# Patient Record
Sex: FEMALE | Race: WHITE | NOT HISPANIC OR LATINO | Employment: OTHER | URBAN - NONMETROPOLITAN AREA
[De-identification: names, ages, dates, MRNs, and addresses within clinical notes are randomized per-mention and may not be internally consistent; named-entity substitution may affect disease eponyms.]

---

## 2017-06-14 ENCOUNTER — AMBULATORY - GICH (OUTPATIENT)
Dept: LAB | Facility: OTHER | Age: 62
End: 2017-06-14

## 2017-06-14 DIAGNOSIS — Z79.899 OTHER LONG TERM (CURRENT) DRUG THERAPY: ICD-10-CM

## 2017-06-14 LAB
CREAT SERPL-MCNC: 1.2 MG/DL (ref 0.7–1.3)
GFR IF NOT AFRICAN AMERICAN - HISTORICAL: 46 ML/MIN/1.73M2
POTASSIUM SERPL-SCNC: 4.5 MMOL/L (ref 3.5–5.1)

## 2017-06-16 ENCOUNTER — OFFICE VISIT - GICH (OUTPATIENT)
Dept: FAMILY MEDICINE | Facility: OTHER | Age: 62
End: 2017-06-16

## 2017-06-16 ENCOUNTER — HISTORY (OUTPATIENT)
Dept: FAMILY MEDICINE | Facility: OTHER | Age: 62
End: 2017-06-16

## 2017-06-16 DIAGNOSIS — J20.9 ACUTE BRONCHITIS: ICD-10-CM

## 2017-06-16 DIAGNOSIS — J01.20 ACUTE ETHMOIDAL SINUSITIS: ICD-10-CM

## 2017-07-27 ENCOUNTER — COMMUNICATION - GICH (OUTPATIENT)
Dept: FAMILY MEDICINE | Facility: OTHER | Age: 62
End: 2017-07-27

## 2017-07-27 DIAGNOSIS — E11.9 TYPE 2 DIABETES MELLITUS WITHOUT COMPLICATIONS (H): ICD-10-CM

## 2017-07-28 ENCOUNTER — COMMUNICATION - GICH (OUTPATIENT)
Dept: FAMILY MEDICINE | Facility: OTHER | Age: 62
End: 2017-07-28

## 2017-07-28 DIAGNOSIS — E11.9 TYPE 2 DIABETES MELLITUS WITHOUT COMPLICATIONS (H): ICD-10-CM

## 2017-07-29 ENCOUNTER — COMMUNICATION - GICH (OUTPATIENT)
Dept: FAMILY MEDICINE | Facility: OTHER | Age: 62
End: 2017-07-29

## 2017-07-29 DIAGNOSIS — E11.9 TYPE 2 DIABETES MELLITUS WITHOUT COMPLICATIONS (H): ICD-10-CM

## 2017-09-07 ENCOUNTER — COMMUNICATION - GICH (OUTPATIENT)
Dept: FAMILY MEDICINE | Facility: OTHER | Age: 62
End: 2017-09-07

## 2017-09-07 DIAGNOSIS — E11.9 TYPE 2 DIABETES MELLITUS WITHOUT COMPLICATIONS (H): ICD-10-CM

## 2017-11-16 ENCOUNTER — OFFICE VISIT - GICH (OUTPATIENT)
Dept: FAMILY MEDICINE | Facility: OTHER | Age: 62
End: 2017-11-16

## 2017-11-16 ENCOUNTER — HISTORY (OUTPATIENT)
Dept: FAMILY MEDICINE | Facility: OTHER | Age: 62
End: 2017-11-16

## 2017-11-16 DIAGNOSIS — I42.2 OTHER HYPERTROPHIC CARDIOMYOPATHY (H): ICD-10-CM

## 2017-11-16 DIAGNOSIS — Z00.00 ENCOUNTER FOR GENERAL ADULT MEDICAL EXAMINATION WITHOUT ABNORMAL FINDINGS: ICD-10-CM

## 2017-11-16 DIAGNOSIS — E11.9 TYPE 2 DIABETES MELLITUS WITHOUT COMPLICATIONS (H): ICD-10-CM

## 2017-11-16 DIAGNOSIS — I10 ESSENTIAL (PRIMARY) HYPERTENSION: ICD-10-CM

## 2017-11-16 LAB
ALB RAND URINE - HISTORICAL: 28.5 MG/L
ANION GAP - HISTORICAL: 9 (ref 5–18)
BUN SERPL-MCNC: 17 MG/DL (ref 7–25)
BUN/CREAT RATIO - HISTORICAL: 14
CALCIUM SERPL-MCNC: 9.4 MG/DL (ref 8.6–10.3)
CHLORIDE SERPLBLD-SCNC: 104 MMOL/L (ref 98–107)
CHOL/HDL RATIO - HISTORICAL: 3.09
CHOLESTEROL TOTAL: 139 MG/DL
CO2 SERPL-SCNC: 26 MMOL/L (ref 21–31)
CREAT SERPL-MCNC: 1.21 MG/DL (ref 0.7–1.3)
CREATININE, URINE - HISTORICAL: 2.14 G/L
ESTIMATED AVERAGE GLUCOSE: 169 MG/DL
GFR IF NOT AFRICAN AMERICAN - HISTORICAL: 45 ML/MIN/1.73M2
GLUCOSE SERPL-MCNC: 173 MG/DL (ref 70–105)
HDLC SERPL-MCNC: 45 MG/DL (ref 23–92)
HEMOGLOBIN A1C MONITORING (POCT) - HISTORICAL: 7.5 % (ref 4–6.2)
LDLC SERPL CALC-MCNC: 59 MG/DL
MICROALBUMIN, RAND UR - HISTORICAL: 13.3 MG/G CREAT
NON-HDL CHOLESTEROL - HISTORICAL: 94 MG/DL
POTASSIUM SERPL-SCNC: 4.8 MMOL/L (ref 3.5–5.1)
PROVIDER ORDERDED STATUS - HISTORICAL: ABNORMAL
SODIUM SERPL-SCNC: 139 MMOL/L (ref 133–143)
TRIGL SERPL-MCNC: 177 MG/DL

## 2017-12-27 NOTE — PROGRESS NOTES
"Patient Information     Patient Name MRN Glenys Parks [id number] Female 1955      Progress Notes by Ton Chaudhry MD at 2017 10:18 AM     Author:  Ton Chaudhry MD Service:  (none) Author Type:  Physician     Filed:  2017 12:28 PM Encounter Date:  2017 Status:  Signed     :  Ton Chaudhry MD (Physician)              SUBJECTIVE:    Glenys Ladd is a 62 y.o. female who presents for px    HPI: She really has no concerns.  Needs refills of all her medications.  Sees Cardiology every 6 months now.  Has no shortness of breath, exercise intolerance or chest pain.  Is rather sedentary by her own admission.  Intends to start back on her treadmill soon.    Is due for an A1c.  Checks a few times a week.  Is always under 130 or so.  Lowest was 90.  Seems to feel \"low\" when she is cleaning the house and more active.    Past Medical History:     Diagnosis  Date     AORTIC STENOSIS     last ultrasound was 2005       AORTIC STENOSIS     last ultrasound was 2014       Ascending aorta enlargement (HC) 2014    - echo 2014: Ascending aorta 4.6 cm       Cardiomyopathy (HC) 2014     CHF, acute on chronic (HC) 2014    With systolic dysfunction EF 20-25% by transthroacic echocardiogram 2014.       DIABETES MELLITUS, TYPE II 10/27/2010     DIVERTICULOSIS, SIGMOID COLON      CHANG (dyspnea on exertion) 2014     Elevated cholesterol 2013     Hx of VSD (ventricular septal defect) 2014    - apparent hx of VSD that had spontaneous closure at age 17 when pregnant (no documentation)       Mitral regurgitation 2014    - echo 2014: moderate MR       POSTMENOPAUSAL STATUS 10/27/2010     S/P ascending aortic aneurysm repair 2014    Repair Ascending Aortic Aneurysm with a 30 mm Gelweave Graft        S/P AVR (aortic valve replacement) 2014    Aortic Valve Replacement with a 25 mm Magna Valve         S/P mitral valve repair 2014 "    Mitral Valve Ring Annuloplasty with a 32 mm Rigid Saddle Ring      Tachycardia 7/17/2014     VAGINITIS 10/27/2010       PROBLEM LIST:  Patient Active Problem List     Diagnosis  Code     AORTIC STENOSIS I35.9     DIVERTICULOSIS, SIGMOID COLON K57.30     DIABETES MELLITUS, TYPE II E11.9     VAGINITIS N76.0     POSTMENOPAUSAL STATUS N95.1     Elevated cholesterol E78.00     Tachycardia R00.0     Ascending aorta enlargement (HC) I77.89     Mitral regurgitation I34.0     Cardiomyopathy (HC) I42.9     Hx of VSD (ventricular septal defect) Q21.0     CHANG (dyspnea on exertion) R06.09     S/P AVR (aortic valve replacement) Z95.2     S/P ascending aortic aneurysm repair Z98.890, Z86.79     S/P mitral valve repair Z98.890     CHF, acute on chronic (HC) I50.9     Hypertension I10     Morbid obesity with BMI of 40.0-44.9, adult (HC) E66.01, Z68.41     Special screening for malignant neoplasms, colon Z12.11     Past Surgical History:      Procedure  Laterality Date     COLONOSCOPY SCREENING  09/07    Follow up recommended in five years.       COLONOSCOPY SCREENING  9/1/2016            IN REPLACE AORTIC VALVE OPEN W STENTLESS TISSUE VALVE  7/14    Pacemaker placed as well       TONSIL AND ADENOIDECTOMY       as a child       TUBAL LIGATION       PAST MEDICAL HISTORY:  Past Medical History:     Diagnosis  Date     AORTIC STENOSIS     last ultrasound was april 2005       AORTIC STENOSIS     last ultrasound was april 2014       Ascending aorta enlargement (HC) 7/17/2014    - echo April 2014: Ascending aorta 4.6 cm       Cardiomyopathy (HC) 7/18/2014     CHF, acute on chronic (HC) 7/25/2014    With systolic dysfunction EF 20-25% by transthroacic echocardiogram 7/2014.       DIABETES MELLITUS, TYPE II 10/27/2010     DIVERTICULOSIS, SIGMOID COLON      CHANG (dyspnea on exertion) 7/18/2014     Elevated cholesterol 9/19/2013     Hx of VSD (ventricular septal defect) 7/18/2014    - apparent hx of VSD that had spontaneous closure at age 17  when pregnant (no documentation)       Mitral regurgitation 7/18/2014    - echo April 2014: moderate MR       POSTMENOPAUSAL STATUS 10/27/2010     S/P ascending aortic aneurysm repair 7/21/2014    Repair Ascending Aortic Aneurysm with a 30 mm Gelweave Graft        S/P AVR (aortic valve replacement) 7/21/2014    Aortic Valve Replacement with a 25 mm Magna Valve         S/P mitral valve repair 7/21/2014    Mitral Valve Ring Annuloplasty with a 32 mm Rigid Saddle Ring      Tachycardia 7/17/2014     VAGINITIS 10/27/2010     SURGICAL HISTORY:  Past Surgical History:      Procedure  Laterality Date     COLONOSCOPY SCREENING  09/07    Follow up recommended in five years.       COLONOSCOPY SCREENING  9/1/2016            [place] REPLACE AORTIC VALVE OPEN W STENTLESS TISSUE VALVE  7/14    Pacemaker placed as well       TONSIL AND ADENOIDECTOMY       as a child       TUBAL LIGATION         SOCIAL HISTORY:  Social History     Social History        Marital status:       Spouse name: N/A     Number of children:  N/A     Years of education:  N/A     Occupational History      Not on file.     Social History Main Topics        Smoking status:  Former Smoker     Packs/day: 0.50     Years: 20.00     Smokeless tobacco:  Never Used     Alcohol use  No     Drug use:  No     Sexual activity:  Not on file     Other Topics   Concern      Service  No     Blood Transfusions  Yes     Caffeine Concern  No     Occasional coffee      Occupational Exposure  No     Hobby Hazards  No     Sleep Concern  No     Stress Concern  No     Weight Concern  Yes     losing weight      Special Diet  No     Back Care  No     Exercise  Yes     Walk 3-4 times a week, swim and play ball in summer      Seat Belt  Yes     100%      Self-Exams  Yes     Breast      Social History Narrative      and works as a  at "[employer]        **pre upload 12/31/2012**        Updated  9/18/2013                         FAMILY HISTORY:  Family History        Problem   Relation Age of Onset     Cancer  Mother       age 74 of liver cancer with a history of CAD       Heart Disease  Mother      history of CAD       Unknown  Father      Unknown       Blood Disease  Sister      Leukemia       Diabetes  Other      Negative for diabetes mellitus       Cancer-breast  Other       CURRENT MEDICATIONS:   Current Outpatient Prescriptions       Medication  Sig Dispense Refill     aspirin chewable 81 mg chewable tablet Take 1 tablet by mouth once daily with a meal.  0     bisoprolol (ZEBETA) 5 mg tablet Take 1 tablet by mouth 2 times daily. 180 tablet 3     Blood Pressure Monitor (BLOOD PRESSURE KIT)  1 Device 0     blood sugar diagnostic (ACCU-CHEK SMARTVIEW TEST STRIP) strip 2 times daily. Dispense item covered by pt ins. Type 2 diabetes mellitus without insulin 100 Strip 11     lancets (ACCU-CHEK FASTCLIX) Test 2 times per day. 100 Each 8     lisinopril (PRINIVIL; ZESTRIL) 10 mg tablet Take 1 tablet by mouth once daily. 90 tablet 3     metFORMIN (GLUCOPHAGE) 1,000 mg tablet Take 1 tablet by mouth 2 times daily with meals. 180 tablet 0     simvastatin (ZOCOR) 10 mg tablet TAKE 1 TABLET BY MOUTH AT BEDTIME 90 tablet 0     spironolactone (ALDACTONE) 25 mg tablet Take 1 tablet by mouth once daily. 92 tablet 3     No current facility-administered medications for this visit.      Medications have been reviewed by me and are current to the best of my knowledge and ability.    ALLERGIES:  Azithromycin     REVIEW OF SYSTEMS:  General: denies any general problems.  Eyes: denies problems  Ears/Nose/Throat: denies problems  Cardiovascular: denies problems  Respiratory: denies problems  Gastrointestinal: denies problems  Genitourinary: denies problems  Musculoskeletal: denies problems  Skin: denies problems  Neurologic: denies problems  Psychiatric: denies problems  Endocrine: denies problems  Heme/Lymphatic: denies problems  Allergic/Immunologic: denies problems  PHQ Depression Screening  "11/16/2017   Date of PHQ exam (doc flow) 11/16/2017   1. Lack of interest/pleasure 0 - Not at all   2. Feeling down/depressed 0 - Not at all   PHQ-2 TOTAL SCORE 0   Some recent data might be hidden       OBJECTIVE:  /70  Pulse 68  Resp 16  Ht 1.689 m (5' 6.5\")  Wt 124.7 kg (275 lb)  BMI 43.72 kg/m2  EXAM:   General Appearance: Pleasant, alert, appropriate appearance for age. No acute distress  Head Exam: Normal. Normocephalic, atraumatic.  Eye Exam:  Normal external eye, conjunctiva, lids, cornea. RTUDI.  Ear Exam: Normal TM's bilaterally. Normal auditory canals and external ears. Non-tender.  Nose Exam: Normal external nose, mucus membranes, and septum.  OroPharynx Exam:  Dental hygiene adequate. Normal buccal mucose. Normal pharynx.  Neck Exam:  Supple, no masses or nodes.  Thyroid Exam: No nodules or enlargement.  Chest/Respiratory Exam: Normal chest wall and respirations. Clear to auscultation.  Cardiovascular Exam: Regular rate and rhythm. S1, S2, stable 2/6 systolic ejection murmur, no click, gallop, or rubs.  Gastrointestinal Exam: Soft, non-tender, no masses or organomegaly.  Lymphatic Exam: Non-palpable nodes in neck, clavicular, axillary, or inguinal regions.  Musculoskeletal Exam: Back is straight and non-tender, full ROM of upper and lower extremities.  Foot Exam: Left and right foot: good pedal pulses, no lesions, nail hygiene good.  Skin: no rash or abnormalities  Neurologic Exam: Nonfocal, symmetric DTRs, normal gross motor, tone coordination and no tremor.  Monofilament is normal  Psychiatric Exam: Alert and oriented - appropriate affect.    ASSESSMENT/PLAN    ICD-10-CM    1. Routine general medical examination at a health care facility Z00.00 MO [facility] MAMMOGRAM SCREENING   2. Type 2 diabetes mellitus without complication, without long-term current use of insulin (HC) E11.9 metFORMIN (GLUCOPHAGE) 1,000 mg tablet      simvastatin (ZOCOR) 10 mg tablet      Hgb A1c      MICROALBUMIN RANDOM URINE "   3. Hypertrophic nonobstructive cardiomyopathy (HC) I42.2 lisinopril (PRINIVIL; ZESTRIL) 10 mg tablet      bisoprolol (ZEBETA) 5 mg tablet      LIPID PANEL   4. Hypertension I10 spironolactone (ALDACTONE) 25 mg tablet      BASIC METABOLIC PANEL     BP Readings from Last 1 Encounters:11/16/17 : 126/70  Ms. Lais blood pressure is out of the normal range for adults. Per JNC-8 guidelines normal adult blood pressure is < 120/80, pre-hypertensive is between 120/80 and 139/89, and hypertension is 140/90 or greater. Risks of hypertension were discussed. Patient's strategy will be to recheck blood pressure in 12 months    I would like her to follow up on the diabetes mellitus at least every 6 months.  Refilled all of her above medications.

## 2017-12-28 NOTE — PROGRESS NOTES
Patient Information     Patient Name MRN Sex Glenys Stapleton [id number] Female 1955      Progress Notes by Carmella Jacobs NP at 2017  3:15 PM     Author:  Carmella Jacobs NP Service:  (none) Author Type:  PHYS- Nurse Practitioner     Filed:  2017  3:58 PM Encounter Date:  2017 Status:  Signed     :  Carmella Jacobs NP (PHYS- Nurse Practitioner)            HPI:    Glenys Ladd is.  a 61 y.o. female who presents to clinic today for URI.    Cough for about 4 weeks.  Coughing up minimal phlegm, cloudy.  Wheezing for the past week.  Shortness of breath with exertion.  Difficulty sleeping due to cough.   Sinus headache x 4 days.  Headache worsens with coughing.   Runny and stuffy nose, congestion and pressure for the past 2 weeks.   No fevers.  Sweats with coughing.  Appetite normal.  Energy decreased.   Tried a bottle of theraflu without relief.  Occasional tylenol for headaches.   Has pacemaker, just had cardiology check up.            Past Medical History:     Diagnosis  Date     AORTIC STENOSIS     last ultrasound was 2005       AORTIC STENOSIS     last ultrasound was 2014       Ascending aorta enlargement (HC) 2014    - echo 2014: Ascending aorta 4.6 cm       Cardiomyopathy (HC) 2014     CHF, acute on chronic (HC) 2014    With systolic dysfunction EF 20-25% by transthroacic echocardiogram 2014.       DIABETES MELLITUS, TYPE II 10/27/2010     DIVERTICULOSIS, SIGMOID COLON      CHANG (dyspnea on exertion) 2014     Elevated cholesterol 2013     Hx of VSD (ventricular septal defect) 2014    - apparent hx of VSD that had spontaneous closure at age 17 when pregnant (no documentation)       Mitral regurgitation 2014    - echo 2014: moderate MR       POSTMENOPAUSAL STATUS 10/27/2010     S/P ascending aortic aneurysm repair 2014    Repair Ascending Aortic Aneurysm with a 30 mm Gelweave Graft        S/P AVR (aortic  valve replacement) 7/21/2014    Aortic Valve Replacement with a 25 mm Magna Valve         S/P mitral valve repair 7/21/2014    Mitral Valve Ring Annuloplasty with a 32 mm Rigid Saddle Ring      Tachycardia 7/17/2014     VAGINITIS 10/27/2010     Past Surgical History:      Procedure  Laterality Date     COLONOSCOPY SCREENING  09/07    Follow up recommended in five years.       COLONOSCOPY SCREENING  9/1/2016            MD REPLACE AORTIC VALVE OPEN W STENTLESS TISSUE VALVE  7/14    Pacemaker placed as well       TONSIL AND ADENOIDECTOMY       as a child       TUBAL LIGATION       Social History      Substance Use Topics        Smoking status:  Former Smoker     Packs/day: 0.50     Years: 20.00     Smokeless tobacco:  Never Used     Alcohol use  No     Current Outpatient Prescriptions       Medication  Sig Dispense Refill     aspirin chewable 81 mg chewable tablet Take 1 tablet by mouth once daily with a meal.  0     bisoprolol (ZEBETA) 5 mg tablet Take 1 tablet by mouth 2 times daily. 180 tablet 3     Blood Pressure Monitor (BLOOD PRESSURE KIT)  1 Device 0     blood sugar diagnostic (ACCU-CHEK SMARTVIEW TEST STRIP) strip 2 times daily. Dispense item covered by pt ins. Type 2 diabetes mellitus without insulin 100 Strip 11     lancets (ACCU-CHEK FASTCLIX) Test 2 times per day. 100 Each 8     lisinopril (PRINIVIL; ZESTRIL) 10 mg tablet Take 1 tablet by mouth once daily. 90 tablet 3     metFORMIN (GLUCOPHAGE) 1,000 mg tablet Take 1 tablet by mouth 2 times daily with meals. 180 tablet 3     simvastatin (ZOCOR) 10 mg tablet Take 1 tablet by mouth at bedtime. 90 tablet 3     spironolactone (ALDACTONE) 25 mg tablet Take 1 tablet by mouth once daily. 92 tablet 3     No current facility-administered medications for this visit.      Medications have been reviewed by me and are current to the best of my knowledge and ability.    Allergies     Allergen  Reactions     Azithromycin Rash       Past medical history, past surgical  "history, current medications and allergies reviewed and accurate to the best of my knowledge.        ROS:  Refer to HPI    /80  Pulse 80  Temp 98.5  F (36.9  C) (Tympanic)   Ht 1.68 m (5' 6.14\")  Wt 126.4 kg (278 lb 9.6 oz)  Breastfeeding? No  BMI 44.77 kg/m2    EXAM:  General Appearance: Well appearing adult female, appropriate appearance for age. No acute distress  Head: normocephalic, atraumatic  Ears: Left TM with bony landmarks appreciated, no erythema, no effusion, no bulging, no purulence.  Right TM with bony landmarks appreciated, no erythema, no effusion, no bulging, no purulence.   Left auditory canal clear.  Right auditory canal clear.  Normal external ears, non tender.  Eyes: conjunctivae normal, no drainage  Orophayrnx: moist mucous membranes, posterior pharynx without erythema, tonsils surgically absent,  no post nasal drip seen.    Sinuses:  No sinus tenderness upon palpation of the frontal or maxillary sinuses.  Tenderness and pressure of the ethmoid sinuses  Nose:  Bilateral nares without erythema, edema, drainage or congestion   Neck: supple without adenopathy  Respiratory: normal chest wall and respirations.  Normal effort.  Clear to auscultation bilaterally, no wheezing, crackles or rhonchi.  No increased work of breathing.  Frequent congested bronchial cough appreciated  Cardiac: RRR with no murmurs  Musculoskeletal:  Normal gait.  Equal movement of bilateral upper extremities.  Equal movement of bilateral lower extremities.    Psychological: normal affect, alert and pleasant        ASSESSMENT/PLAN:    ICD-10-CM    1. Bronchitis, acute, with bronchospasm J20.9 amoxicillin-clavulanate 875-125 mg tablet (AUGMENTIN)      predniSONE (DELTASONE) 20 mg tablet   2. Acute ethmoidal sinusitis, recurrence not specified J01.20 amoxicillin-clavulanate 875-125 mg tablet (AUGMENTIN)         Augmentin 875-125 mg BID x 10 days  Prednisone 20 mg daily x 3 days, take with food  Encouraged " fluids  Symptomatic treatment - salt water gargles, honey, elevation, humidifier, sinus rinse/netti pot, lozenges, etc   Tylenol or ibuprofen PRN  Follow up if symptoms persist or worsen or concerns          Patient Instructions   Prednisone once daily x 3 days, take with food    Augmentin twice daily x 10 days         Encouraged fluids and rest.    May use symptomatic care with tylenol or ibuprofen.     Using a humidifier works well to break up the congestion.     Elevate the mattress to 15 degrees in order to help with the congestion.    Frequent swallows of cool liquid.      Oatmeal or honey coats the throat and some patients find it soothes the pain.     Salt water gargles as needed    Return to clinic with change/worsening of symptoms or concerns.

## 2017-12-28 NOTE — TELEPHONE ENCOUNTER
Patient Information     Patient Name MRN Glenys Parks [id number] Female 1955      Telephone Encounter by Mateo Alvarado RN at 2017 11:16 AM     Author:  Mateo Alvarado RN Service:  (none) Author Type:  NURS- Registered Nurse     Filed:  2017 11:21 AM Encounter Date:  2017 Status:  Signed     :  Mateo Alvarado RN (NURS- Registered Nurse)            Statins    Office visit in the past 12 months.    Last visit with SAHARA ROCHA was on: 2016 in [employer] [facility]  Next visit with SAHARA ROCHA is on: No future appointment listed with this provider  Next visit with Family Practice is on: No future appointment listed in this department    Lab testing requirements:  Lipids annually.  Repeat lipids 6-8 weeks after dosage or drug change.    Last Lipids:  Chol: 136    7/15/2016  T    7/15/2016  HDL:   43    7/15/2016  LDL:  70    7/15/2016  LDL DIRECT:  No results found in past 5 years    .    Concommitant use of fibrates and statins-If it is an addition to the medication list, review note and/or discuss with provider.  If already on medication list, refill.    Max refills 12 months from last office visit.    Pt received 90 day supply on 17  That was confirmed by pharmacy as received . Requested too soon. Unable to complete prescription refill per RN Medication Refill Policy.................... MATEO ALVARADO RN ....................  2017   11:19 AM

## 2017-12-28 NOTE — TELEPHONE ENCOUNTER
Patient Information     Patient Name MRN Glenys Parks [id number] Female 1955      Telephone Encounter by Sky Savage RN at 2017  6:43 AM     Author:  Sky Savage RN Service:  (none) Author Type:  NURS- Registered Nurse     Filed:  2017  6:44 AM Encounter Date:  2017 Status:  Signed     :  Sky Savage RN (NURS- Registered Nurse)            Statins    Office visit in the past 12 months.    Last visit with SAHARA ROCHA was on: 2016 in [employer] [facility]  Next visit with SAHARA ROCHA is on: No future appointment listed with this provider  Next visit with Family Practice is on: No future appointment listed in this department    Lab testing requirements:  Lipids annually.  Repeat lipids 6-8 weeks after dosage or drug change.    Last Lipids:  Chol: 136    7/15/2016  T    7/15/2016  HDL:   43    7/15/2016  LDL:  70    7/15/2016  LDL DIRECT:  No results found in past 5 years    .    Concommitant use of fibrates and statins-If it is an addition to the medication list, review note and/or discuss with provider.  If already on medication list, refill.    Max refills 12 months from last office visit.    Prescription refilled per RN Medication Refill Policy.................... SKY SAVAGE RN ....................  2017   6:44 AM

## 2017-12-28 NOTE — TELEPHONE ENCOUNTER
Patient Information     Patient Name MRN Glenys Parks [id number] Female 1955      Telephone Encounter by Alina Abdullahi RN at 2017  3:00 PM     Author:  Alina Abdullahi RN Service:  (none) Author Type:  NURS- Registered Nurse     Filed:  2017  3:07 PM Encounter Date:  2017 Status:  Signed     :  Alina Abdullahi RN (NURS- Registered Nurse)            Biguanides    Office visit in the past 12 months or per provider note.    Last visit with SAHARA ROCHA was on: 2016 in [facility]  Next visit with SAHARA ROCHA is on: No future appointment listed with this provider  Next visit with Family Practice is on: No future appointment listed in this department    Lab test requirements:  HgbA1c annually or per provider note.  HEMOGLOBIN A1C MONITORING (POCT) (%)    Date Value   07/15/2016 6.5 (H)   2014 5.7       Max refill for 12 months from last office visit or per provider note.    If taking for polycystic ovary disease, may refill for 12 months.    Patient is due for medication management appointment. Limited refill provided at this time. [employer] message and/or letter sent for reminder to patient. Prescription refilled per RN Medication Refill Policy.................... Alina Abdullahi RN ....................  2017   3:01 PM

## 2017-12-28 NOTE — PROGRESS NOTES
Patient Information     Patient Name MRN Glenys Parks [id number] Female 1955      Progress Notes by Yandy Huerta at 2017  2:43 PM     Author:  Yandy Huerta Service:  (none) Author Type:  (none)     Filed:  2017  2:43 PM Encounter Date:  2017 Status:  Signed     :  Yandy Huerta            See telephone encounter.  Yandy Huerta

## 2017-12-29 NOTE — PATIENT INSTRUCTIONS
Patient Information     Patient Name MRN Glenys Parks [id number] Female 1955      Patient Instructions by Carmella Jacobs NP at 2017  3:15 PM     Author:  Carmella Jacobs NP Service:  (none) Author Type:  PHYS- Nurse Practitioner     Filed:  2017  3:45 PM Encounter Date:  2017 Status:  Signed     :  Carmella Jacobs NP (PHYS- Nurse Practitioner)            Prednisone once daily x 3 days, take with food    Augmentin twice daily x 10 days         Encouraged fluids and rest.    May use symptomatic care with tylenol or ibuprofen.     Using a humidifier works well to break up the congestion.     Elevate the mattress to 15 degrees in order to help with the congestion.    Frequent swallows of cool liquid.      Oatmeal or honey coats the throat and some patients find it soothes the pain.     Salt water gargles as needed    Return to clinic with change/worsening of symptoms or concerns.

## 2017-12-30 NOTE — NURSING NOTE
Patient Information     Patient Name MRN Glenys Parks [id number] Female 1955      Nursing Note by Kathryn Van at 2017  3:15 PM     Author:  Kathryn Van Service:  (none) Author Type:  NURS- Student Practical Nurse     Filed:  2017  3:35 PM Encounter Date:  2017 Status:  Signed     :  Kathryn Van (NURS- Student Practical Nurse)            Patient presents with cough, sinus congestion, wheezing, lack of sleep for 2 weeks. Headache for 4 days. Kathryn Van LPN .............2017  3:29 PM

## 2017-12-30 NOTE — NURSING NOTE
Patient Information     Patient Name MRN Glenys Parks [id number] Female 1955      Nursing Note by Chiquita Reddy at 2017 10:00 AM     Author:  Chiquita Reddy Service:  (none) Author Type:  (none)     Filed:  2017 10:17 AM Encounter Date:  2017 Status:  Signed     :  Chiquita Reddy            Coming in for a physical   Chiquita Reddy ....................  2017   9:57 AM

## 2018-01-04 ENCOUNTER — HOSPITAL ENCOUNTER (OUTPATIENT)
Dept: RADIOLOGY | Facility: OTHER | Age: 63
End: 2018-01-04
Attending: FAMILY MEDICINE

## 2018-01-04 ENCOUNTER — HISTORY (OUTPATIENT)
Dept: RADIOLOGY | Facility: OTHER | Age: 63
End: 2018-01-04

## 2018-01-04 DIAGNOSIS — Z12.31 ENCOUNTER FOR SCREENING MAMMOGRAM FOR MALIGNANT NEOPLASM OF BREAST: ICD-10-CM

## 2018-01-26 VITALS
HEIGHT: 66 IN | WEIGHT: 278.6 LBS | DIASTOLIC BLOOD PRESSURE: 80 MMHG | HEART RATE: 80 BPM | BODY MASS INDEX: 44.77 KG/M2 | SYSTOLIC BLOOD PRESSURE: 140 MMHG | TEMPERATURE: 98.5 F

## 2018-01-26 VITALS
DIASTOLIC BLOOD PRESSURE: 70 MMHG | RESPIRATION RATE: 16 BRPM | SYSTOLIC BLOOD PRESSURE: 126 MMHG | WEIGHT: 275 LBS | BODY MASS INDEX: 43.16 KG/M2 | HEIGHT: 67 IN | HEART RATE: 68 BPM

## 2018-02-07 ENCOUNTER — DOCUMENTATION ONLY (OUTPATIENT)
Dept: FAMILY MEDICINE | Facility: OTHER | Age: 63
End: 2018-02-07

## 2018-02-07 PROBLEM — K57.30 DIVERTICULOSIS OF LARGE INTESTINE: Status: ACTIVE | Noted: 2018-02-07

## 2018-02-07 RX ORDER — BLOOD PRESSURE TEST KIT-MEDIUM
KIT MISCELLANEOUS
COMMUNITY
Start: 2014-09-05

## 2018-02-07 RX ORDER — ASPIRIN 81 MG/1
81 TABLET, CHEWABLE ORAL
COMMUNITY

## 2018-02-07 RX ORDER — SIMVASTATIN 10 MG
10 TABLET ORAL AT BEDTIME
COMMUNITY
Start: 2017-11-16 | End: 2020-08-14

## 2018-02-07 RX ORDER — BISOPROLOL FUMARATE 5 MG/1
5 TABLET, FILM COATED ORAL 2 TIMES DAILY
COMMUNITY
Start: 2017-11-16 | End: 2020-08-14

## 2018-02-07 RX ORDER — LISINOPRIL 10 MG/1
10 TABLET ORAL DAILY
COMMUNITY
Start: 2017-11-16 | End: 2020-08-14

## 2018-02-07 RX ORDER — SPIRONOLACTONE 25 MG/1
25 TABLET ORAL DAILY
COMMUNITY
Start: 2017-11-16 | End: 2018-11-18

## 2018-02-07 RX ORDER — BLOOD-GLUCOSE METER
KIT MISCELLANEOUS 2 TIMES DAILY
COMMUNITY
Start: 2013-04-26 | End: 2020-08-14

## 2018-02-12 NOTE — PROGRESS NOTES
Patient Information     Patient Name MRN Glenys Parks [id number] Female 1955      Progress Notes by Landy Vargas at 2018  8:19 AM     Author:  Landy Vargas Service:  (none) Author Type:  (none)     Filed:  2018  8:19 AM Date of Service:  2018  8:19 AM Status:  Signed     :  Landy Vargas            Falls Risk Criteria:    Age 65 and older or under age 4        Sensory deficits    Poor vision    Use of ambulatory aides    Impaired judgment    Unable to walk independently    Meets High Risk criteria for falls:  no

## 2018-07-24 NOTE — PROGRESS NOTES
Patient Information     Patient Name  Glenys Ladd MRN  [id number] Sex  Female   1955      Letter by Ton Chaudhry MD at      Author:  Ton Chaudhry MD Service:  (none) Author Type:  (none)    Filed:   Encounter Date:  2017 Status:  (Other)           Glenys Ladd  [address] [facility] [address]          2017    Dear Ms. Ladd:    This letter is to remind you that you are due for your annual exam with Ton Chaudhry MD. Your last comprehensive visit was more than 12 months ago.    A LIMITED refill of metFORMIN (GLUCOPHAGE) 1,000 mg tablet has been called into your pharmacy. Additional refills require you to complete this appointment.    Please call the clinic at [phone] to schedule your appointment.    If you should require additional refills before your scheduled appointment, please contact your pharmacy and we will refill your medication until the date of your appointment.    If you are no longer seeing Ton Chaudhry MD for primary care, please call to let us know. Doing so will remove you from our call/contact list.      Thank you for choosing [facility] for your health care needs.    Sincerely,    Refill RN  [facility]

## 2018-11-11 ENCOUNTER — HOSPITAL ENCOUNTER (EMERGENCY)
Facility: OTHER | Age: 63
Discharge: HOME | End: 2018-11-11
Attending: FAMILY MEDICINE | Admitting: FAMILY MEDICINE
Payer: COMMERCIAL

## 2018-11-11 VITALS
HEIGHT: 66 IN | BODY MASS INDEX: 43.39 KG/M2 | OXYGEN SATURATION: 97 % | TEMPERATURE: 97.4 F | WEIGHT: 270 LBS | RESPIRATION RATE: 15 BRPM

## 2018-11-11 DIAGNOSIS — H10.13 ALLERGIC CONJUNCTIVITIS, BILATERAL: ICD-10-CM

## 2018-11-11 PROCEDURE — 99283 EMERGENCY DEPT VISIT LOW MDM: CPT | Mod: Z6 | Performed by: FAMILY MEDICINE

## 2018-11-11 PROCEDURE — 99283 EMERGENCY DEPT VISIT LOW MDM: CPT | Performed by: FAMILY MEDICINE

## 2018-11-11 PROCEDURE — 25000132 ZZH RX MED GY IP 250 OP 250 PS 637: Performed by: FAMILY MEDICINE

## 2018-11-11 RX ORDER — POLYMYXIN B SULFATE AND TRIMETHOPRIM 1; 10000 MG/ML; [USP'U]/ML
2 SOLUTION OPHTHALMIC ONCE
Status: COMPLETED | OUTPATIENT
Start: 2018-11-11 | End: 2018-11-11

## 2018-11-11 RX ORDER — POLYMYXIN B SULFATE AND TRIMETHOPRIM 1; 10000 MG/ML; [USP'U]/ML
2 SOLUTION OPHTHALMIC
Status: DISCONTINUED | OUTPATIENT
Start: 2018-11-11 | End: 2018-11-11

## 2018-11-11 RX ADMIN — POLYMYXIN B SULFATE AND TRIMETHOPRIM 2 DROP: 1; 10000 SOLUTION OPHTHALMIC at 02:28

## 2018-11-11 ASSESSMENT — ENCOUNTER SYMPTOMS
FEVER: 0
EYE PAIN: 1
HEADACHES: 0
FATIGUE: 0
VOMITING: 0
COUGH: 0
EYE DISCHARGE: 1
EYE REDNESS: 1
EYE ITCHING: 1
SORE THROAT: 0
NAUSEA: 0
PHOTOPHOBIA: 0
CHILLS: 0

## 2018-11-11 NOTE — ED TRIAGE NOTES
"ED Nursing Triage Note (General)   ________________________________    Glenys Ladd is a 63 year old Female that presents to triage private car  With history of  Pink eye that had onset Friday night with itching and yesterday had worsening of sx by evening and now having reddened sclera, itching, mattering and pain.  Blurring of vision due to mattered eyes.  Vision at 20/50 for both eyes  reported by patient   Significant symptoms had onset 2 day(s) ago.  Temp 97.4  F (36.3  C) (Tympanic)  Resp 15  Ht 1.676 m (5' 6\")  Wt 122.5 kg (270 lb)  LMP  (Approximate)  SpO2 97%  BMI 43.58 kg/m2t  Patient appears alert , in moderate distress., and cooperative behavior.    GCS Total = 15  Airway: intact  Breathing noted as Normal.  Circulation Normal  Skin normal  Action taken:  To room 907      PRE HOSPITAL PRIOR LIVING SITUATION Spouse and Children  "

## 2018-11-11 NOTE — ED AVS SNAPSHOT
[facility]    [address]    [place] [address]    Phone:  [phone]    Fax:  [phone]                                       Glenys Ladd   MRN: [id number]    Department:  [facility]   Date of Visit:  11/11/2018           Patient Information     Date Of Birth          1955        Your diagnoses for this visit were:     Allergic conjunctivitis, bilateral        You were seen by Curt Phillips MD.      Follow-up Information     Follow up with Ton Chaudhry MD.    Specialty:  Family Practice    Why:  If symptoms worsen    Contact information:    [email or web address]  [place] [address]  [phone]        Discharge References/Attachments     CONJUNCTIVITIS, NONSPECIFIC (ENGLISH)      24 Hour Appointment Hotline     To schedule an appointment at [facility], please call [phone]. If you don't have a family doctor or clinic, we will help you find one. [facility] are conveniently located to serve the needs of you and your family.           Review of your medicines      Our records show that you are taking the medicines listed below. If these are incorrect, please call your family doctor or clinic.        Dose / Directions Last dose taken    aspirin 81 MG chewable tablet   Dose:  81 mg        Take 81 mg by mouth daily with food   Refills:  0        B-D ULTRA-FINE 33 LANCETS Misc        2 times daily   Refills:  0        bisoprolol 5 MG tablet   Commonly known as:  ZEBETA   Dose:  5 mg        Take 5 mg by mouth 2 times daily   Refills:  0        blood glucose monitoring test strip   Commonly known as:  no brand specified        2 times daily. Dispense item covered by pt ins. Type 2 diabetes mellitus without insulin   Refills:  0        lisinopril 10 MG tablet   Commonly known as:  PRINIVIL/ZESTRIL   Dose:  10 mg        Take 10 mg by mouth daily   Refills:  0        metFORMIN 1000 MG tablet   Commonly known as:  GLUCOPHAGE   Dose:   "1000 mg        Take 1,000 mg by mouth 2 times daily (with meals)   Refills:  0        RA BLOOD PRESSURE CUFF MONITOR Misc        Refills:  0        simvastatin 10 MG tablet   Commonly known as:  ZOCOR   Dose:  10 mg        Take 10 mg by mouth At Bedtime   Refills:  0        spironolactone 25 MG tablet   Commonly known as:  ALDACTONE   Dose:  25 mg        Take 25 mg by mouth daily   Refills:  0                Orders Needing Specimen Collection     None      Pending Results     No orders found from 11/9/2018 to 11/12/2018.            Pending Culture Results     No orders found from 11/9/2018 to 11/12/2018.            Pending Results Instructions     If you had any lab results that were not finalized at the time of your Discharge, you can call the ED Lab Result RN at [phone]. You will be contacted by this team for any positive Lab results or changes in treatment. The nurses are available 7 days a week from 10A to 6:30P.  You can leave a message 24 hours per day and they will return your call.        Thank you for choosing [facility]       Thank you for choosing [facility] for your care. Our goal is always to provide you with excellent care. Hearing back from our patients is one way we can continue to improve our services. Please take a few minutes to complete the written survey that you may receive in the mail after you visit with us. Thank you!        [employer] Information     [employer] lets you send messages to your doctor, view your test results, renew your prescriptions, schedule appointments and more. To sign up, go to [email or web address] . Click on \"Log in\" on the left side of the screen, which will take you to the Welcome page. Then click on \"Sign up Now\" on the right side of the page.     You will be asked to enter the access code listed below, as well as some personal information. Please follow the directions to create your username and password.     Your access code is: [email or web address]  Expires: 2/9/2019  2:29 " AM     Your access code will  in 90 days. If you need help or a new code, please call your [facility] clinic or [phone].        Care EveryWhere ID     This is your Care EveryWhere ID. This could be used by other organizations to access your [facility] medical records  [id number]        Equal Access to Services     MATEUSZ SARAVIA : Maximilian grewal Sotam, waaxda luqadaha, qaybta kaalmakarmen domingo, laquita pearce. So [facility] [phone].    ATENCIÓN: Si habla español, tiene a whitehead disposición servicios gratuitos de asistencia lingüística. Llame al [phone].    We comply with applicable federal civil rights laws and [place] laws. We do not discriminate on the basis of race, color, national origin, age, disability, sex, sexual orientation, or gender identity.            After Visit Summary       This is your record. Keep this with you and show to your community pharmacist(s) and doctor(s) at your next visit.

## 2018-11-11 NOTE — ED AVS SNAPSHOT
[facility]    [address]    [place] [address]    Phone:  [phone]    Fax:  [phone]                                       Glenys Ladd   MRN: [id number]    Department:  [facility]   Date of Visit:  11/11/2018           After Visit Summary Signature Page     I have received my discharge instructions, and my questions have been answered. I have discussed any challenges I see with this plan with the nurse or doctor.    ..........................................................................................................................................  Patient/Patient Representative Signature      ..........................................................................................................................................  Patient Representative Print Name and Relationship to Patient    ..................................................               ................................................  Date                                   Time    ..........................................................................................................................................  Reviewed by Signature/Title    ...................................................              ..............................................  Date                                               Time          22EPIC Rev 08/18

## 2018-11-11 NOTE — ED PROVIDER NOTES
"  History     Chief Complaint   Patient presents with     Conjunctivitis     HPI  Glenys Ladd is a 63 year old female who presents to ED complaining of \"pink eye\" that started yesterday and has worsened.  She states this started out with some mild discomfort and itching and then over the last 24 hours the eyes become red, irritated with purulent discharge present bilaterally.  She does not wear contact lenses.  She denies any fever, sweats, chills, URI symptoms, sore throat or cough.      Problem List:    Patient Active Problem List    Diagnosis Date Noted     Diverticulosis of large intestine 02/07/2018     Priority: Medium     Special screening for malignant neoplasms, colon 08/31/2016     Priority: Medium     Morbid obesity with BMI of 40.0-44.9, adult (H) 07/15/2016     Priority: Medium     Hypertension 05/20/2016     Priority: Medium     CHF, acute on chronic (H) 07/25/2014     Priority: Medium     Overview:   With systolic dysfunction EF 20-25% by transthroacic echocardiogram 7/2014.       S/P ascending aortic aneurysm repair 07/21/2014     Priority: Medium     Overview:   - echo April 2014: Ascending aorta 4.6 cm  Repair Ascending Aortic Aneurysm with a 30 mm Gelweave Graft        S/P AVR (aortic valve replacement) 07/21/2014     Priority: Medium     Overview:   - Echo April 2014: Severe aortic stenosis with a mean gradient of 52 mmHg and a calculated valve area of 0.8 cm2.  Aortic Valve Replacement with a 25 mm Magna Valve        S/P mitral valve repair 07/21/2014     Priority: Medium     Overview:   - echo April 2014: moderate MR  Mitral Valve Ring Annuloplasty with a 32 mm Rigid Saddle Ring       Cardiomyopathy (H) 07/18/2014     Priority: Medium     CHANG (dyspnea on exertion) 07/18/2014     Priority: Medium     VSD (ventricular septal defect) 07/18/2014     Priority: Medium     Overview:   - apparent hx of VSD that had spontaneous closure at age 17 when pregnant (no documentation)       Mitral " regurgitation 2014     Priority: Medium     Overview:   - echo 2014: moderate MR       Tachycardia 2014     Priority: Medium     Elevated cholesterol 2013     Priority: Medium     Diabetes mellitus, type II (H) 10/27/2010     Priority: Medium     Vaginitis 10/27/2010     Priority: Medium        Past Medical History:    Past Medical History:   Diagnosis Date     Acute vaginitis      Cardiomyopathy (H)      Diverticulosis of large intestine without perforation or abscess without bleeding      Female climacteric state      Heart failure (H)      Nonrheumatic aortic valve disorder      Nonrheumatic aortic valve disorder      Nonrheumatic mitral valve insufficiency      Other forms of dyspnea      Other specified disorders of arteries and arterioles (CODE)      Other specified postprocedural states      Other specified postprocedural states      Presence of prosthetic heart valve      Pure hypercholesterolemia      Tachycardia      Type 2 diabetes mellitus without complications (H)      Ventricular septal defect        Past Surgical History:    Past Surgical History:   Procedure Laterality Date     COLONOSCOPY      ,Follow up recommended in five years.     COLONOSCOPY      2016     LAPAROSCOPIC TUBAL LIGATION      No Comments Provided     OTHER SURGICAL HISTORY      ,37720.0,NM REPLACE AORTIC VALVE OPEN W STENTLESS TISSUE VALVE,Pacemaker placed as well     TONSILLECTOMY, ADENOIDECTOMY, COMBINED       as a child       Family History:    Family History   Problem Relation Age of Onset     Cancer Mother      Cancer, age 74 of liver cancer with a history of CAD     HEART DISEASE Mother      Heart Disease,history of CAD     Unknown/Adopted Father      Unknown,Unknown     Blood Disease Sister      Blood Disease,Leukemia     Diabetes Other      Diabetes,Negative for diabetes mellitus     Breast Cancer Other      Cancer-breast       Social History:  Marital Status:   [2]  Social  "History   Substance Use Topics     Smoking status: Former Smoker     Packs/day: 0.50     Years: 20.00     Smokeless tobacco: Never Used     Alcohol use No        Medications:      aspirin 81 MG chewable tablet   bisoprolol (ZEBETA) 5 MG tablet   lisinopril (PRINIVIL/ZESTRIL) 10 MG tablet   metFORMIN (GLUCOPHAGE) 1000 MG tablet   simvastatin (ZOCOR) 10 MG tablet   spironolactone (ALDACTONE) 25 MG tablet   B-D ULTRA-FINE 33 LANCETS MISC   blood glucose monitoring (NO BRAND SPECIFIED) test strip   Blood Pressure Monitoring (RA BLOOD PRESSURE CUFF MONITOR) MISC         Review of Systems   Constitutional: Negative for chills, fatigue and fever.   HENT: Negative for congestion and sore throat.    Eyes: Positive for pain, discharge, redness and itching. Negative for photophobia and visual disturbance.   Respiratory: Negative for cough.    Cardiovascular: Negative for chest pain.   Gastrointestinal: Negative for nausea and vomiting.   Skin: Negative for rash.   Neurological: Negative for headaches.   All other systems reviewed and are negative.      Physical Exam   Heart Rate: 72  Temp: 97.4  F (36.3  C)  Resp: 15  Height: 167.6 cm (5' 6\")  Weight: 122.5 kg (270 lb)  SpO2: 97 %      Physical Exam   Constitutional: She is oriented to person, place, and time. She appears well-developed and well-nourished. She is cooperative. She does not appear ill.   HENT:   Head: Normocephalic and atraumatic.   Right Ear: External ear normal.   Left Ear: External ear normal.   Nose: Nose normal.   Mouth/Throat: Oropharynx is clear and moist.   Eyes: EOM are normal. Pupils are equal, round, and reactive to light. Right eye exhibits discharge. Right eye exhibits no chemosis. Left eye exhibits discharge. Left eye exhibits no chemosis. Right conjunctiva is injected. Left conjunctiva is injected.   Fundoscopic exam:       The right eye shows no papilledema. The right eye shows red reflex.        The left eye shows no papilledema. The left eye " shows red reflex.   Neck: Normal range of motion. Neck supple. No thyromegaly present.   Cardiovascular: Normal rate, regular rhythm and intact distal pulses.    Murmur heard.   Systolic murmur is present with a grade of 2/6   Pulmonary/Chest: Effort normal and breath sounds normal.   Abdominal: Soft. Bowel sounds are normal.   Musculoskeletal: Normal range of motion.   Lymphadenopathy:     She has no cervical adenopathy.   Neurological: She is alert and oriented to person, place, and time.   Skin: Skin is warm and dry. No rash noted.   Nursing note and vitals reviewed.      ED Course     ED Course     Procedures  No results found for this or any previous visit (from the past 24 hour(s)).    Medications   trimethoprim-polymyxin b (POLYTRIM) ophthalmic solution 2 drop (2 drops Both Eyes Given 11/11/18 1214)       I had a discussion with the patient and the family regarding the symptoms, exam results, diagnosis, and plan.  Exam findings consistent with bilateral conjunctivitis.  The patient is started on Polytrim drops and will continue these 4 times daily for the next 7 days.  Return for worsening symptoms or follow-up with primary care as needed.  I answered all questions to the best of my ability.    The patient voiced understanding of the plan, was agreeable, and had no further questions or concerns. Advised to return for any worsening symptoms.      Assessments & Plan (with Medical Decision Making)     I have reviewed the nursing notes.    I have reviewed the findings, diagnosis, plan and need for follow up with the patient.    Current Discharge Medication List          Final diagnoses:   Allergic conjunctivitis, bilateral       11/11/2018   [facility]     Curt Phillips MD  11/11/18 [id number]

## 2018-11-18 DIAGNOSIS — I10 HYPERTENSION, UNSPECIFIED TYPE: Primary | ICD-10-CM

## 2018-11-20 RX ORDER — SPIRONOLACTONE 25 MG/1
TABLET ORAL
Qty: 90 TABLET | Refills: 0 | Status: SHIPPED | OUTPATIENT
Start: 2018-11-20 | End: 2019-03-08

## 2018-12-09 DIAGNOSIS — E11.9 TYPE 2 DIABETES MELLITUS WITHOUT COMPLICATION, WITHOUT LONG-TERM CURRENT USE OF INSULIN (H): Primary | ICD-10-CM

## 2018-12-11 RX ORDER — SIMVASTATIN 10 MG
10 TABLET ORAL
COMMUNITY
Start: 2018-10-23 | End: 2020-08-14

## 2018-12-11 RX ORDER — DIPHENHYD/PHENYLEPH/ACETAMINOP 12.5-5-325
TABLET ORAL
COMMUNITY
Start: 2014-09-05 | End: 2021-10-14

## 2018-12-11 RX ORDER — SPIRONOLACTONE 25 MG/1
25 TABLET ORAL
COMMUNITY
Start: 2018-10-23 | End: 2019-03-08

## 2018-12-11 RX ORDER — BISOPROLOL FUMARATE 5 MG/1
5 TABLET, FILM COATED ORAL
COMMUNITY
Start: 2018-10-23 | End: 2020-08-14

## 2018-12-11 RX ORDER — LISINOPRIL 10 MG/1
10 TABLET ORAL
COMMUNITY
Start: 2018-10-23 | End: 2020-08-14

## 2018-12-11 NOTE — TELEPHONE ENCOUNTER
[facility] sent Rx request for the following:     METFORMIN 1000MG TABLETS  Sig: TAKE 1 TABLET BY MOUTH TWICE DAILY WITH MEALS  Last Written Prescription Date:  11/16/17  Last Fill Quantity: 180,   # refills: 3    Last Office Visit: 11/16/17 (Physical) Pt was due for diabetic check up, around 5/16/18.  Future Office visit: None.    Routing refill request to provider for review/approval because:  Biguanide Agents [id number]/11 4:17 PM   Blood pressure less than 140/90 in past 6 months    Patient has documented LDL within the past 12 mos.    Patient has documented A1c within the specified period of time.    Recent (6 mo) or future (30 days) visit within the authorizing provider's specialty     Patient is nearly 6 months overdue for 6-month diabetic check-up. Unable to reach Patient to assist her in scheduling appointment. Will route to PCP for refill consideration. Unable to complete prescription refill per RN Medication Refill Policy. Haylie Priest RN .............. 12/12/2018  9:05 AM

## 2019-01-07 ENCOUNTER — TELEPHONE (OUTPATIENT)
Dept: FAMILY MEDICINE | Facility: OTHER | Age: 64
End: 2019-01-07

## 2019-01-07 NOTE — TELEPHONE ENCOUNTER
"Note metformin filled for 1 year 12/12/2018 (receipt confirmed by pharmacy).    Contacted [facility] and they state that Rx was received but was \"hung up\" as it was new.  Was released and ready to go while on the phone with pharmacy.     Attempted to contact pt to relay message that Rx was now able to be filled.    No answer.  Left message to [facility]     Evi Gleason RN  ....................  1/7/2019   3:09 PM      "

## 2019-03-08 DIAGNOSIS — I10 ESSENTIAL HYPERTENSION: Primary | ICD-10-CM

## 2019-03-08 RX ORDER — SPIRONOLACTONE 25 MG/1
TABLET ORAL
Qty: 90 TABLET | Refills: 3 | Status: SHIPPED | OUTPATIENT
Start: 2019-03-08 | End: 2020-06-03

## 2019-03-08 NOTE — TELEPHONE ENCOUNTER
[facility] sent Rx request for the following:      SPIRONOLACTONE 25MG TABLETS  Sig: TAKE 1 TABLET BY MOUTH EVERY DAY  Last Prescription Date:   11/20/18  Last Fill Qty/Refills:         90, R-0    Last Office Visit: 11/16/17 (Physical) Pt was due for diabetic check up, around 5/16/18.  Future Office visit: None.     Routing refill request to provider for review/approval because:  Diuretics (Including Combos) Protocol Failed3/8 12:25 PM   Blood pressure under 140/90 in past 12 months    Recent (12 mo) or future (30 days) visit within the authorizing provider's specialty    Normal serum creatinine on file in past 12 months    Normal serum potassium on file in past 12 months    Normal serum sodium on file in past 12 months    Medication is active on med list     Per chart review, 90-day denise refill was given on 11/20, with note to pharmacy, from Dr. Chaudhry, that Patient needs appointment.    Unable to complete prescription refill per RN Medication Refill Policy. Haylie Priest RN .............. 3/8/2019  12:39 PM

## 2019-03-18 ENCOUNTER — OFFICE VISIT (OUTPATIENT)
Dept: FAMILY MEDICINE | Facility: OTHER | Age: 64
End: 2019-03-18
Attending: NURSE PRACTITIONER
Payer: COMMERCIAL

## 2019-03-18 VITALS
BODY MASS INDEX: 42.11 KG/M2 | TEMPERATURE: 99.6 F | WEIGHT: 262 LBS | HEIGHT: 66 IN | HEART RATE: 60 BPM | RESPIRATION RATE: 24 BRPM | OXYGEN SATURATION: 95 % | DIASTOLIC BLOOD PRESSURE: 62 MMHG | SYSTOLIC BLOOD PRESSURE: 104 MMHG

## 2019-03-18 DIAGNOSIS — J06.9 VIRAL URI WITH COUGH: Primary | ICD-10-CM

## 2019-03-18 PROCEDURE — 99214 OFFICE O/P EST MOD 30 MIN: CPT | Performed by: NURSE PRACTITIONER

## 2019-03-18 RX ORDER — BENZONATATE 200 MG/1
200 CAPSULE ORAL 3 TIMES DAILY PRN
Qty: 30 CAPSULE | Refills: 0 | Status: SHIPPED | OUTPATIENT
Start: 2019-03-18 | End: 2019-03-28

## 2019-03-18 RX ORDER — CODEINE PHOSPHATE AND GUAIFENESIN 10; 100 MG/5ML; MG/5ML
2 SOLUTION ORAL EVERY 6 HOURS PRN
Qty: 160 ML | Refills: 0 | Status: SHIPPED | OUTPATIENT
Start: 2019-03-18 | End: 2021-10-11

## 2019-03-18 ASSESSMENT — MIFFLIN-ST. JEOR: SCORE: 1765.17

## 2019-03-18 ASSESSMENT — PAIN SCALES - GENERAL: PAINLEVEL: MILD PAIN (3)

## 2019-03-18 NOTE — PROGRESS NOTES
Nursing Notes:   Joyce Esparza LPN  3/18/2019 12:44 PM  Sign at exiting of workspace  Patient in clinic for cough, nasal congestion, and headache x 5 days. Wants to be sure she is not contagious, so she can go back to work.  Tx with Theraflu  Joyce Esparza LPN....................  3/18/2019   12:44 PM    Chief Complaint   Patient presents with     Cough     Nasal Congestion     Headache       Medication Reconciliation: complete    Joyce Esparza LPN      SUBJECTIVE:   Glenys Ladd is a 63 year old female who presents to clinic today for the following health issues:    RESPIRATORY SYMPTOMS      Duration: 5 days    Description  rhinorrhea, cough and myalgias, nasal congestion, and nasal drainage    Severity: severe    Accompanying signs and symptoms: No fever or chills. No sob, wheezing, has a dry cough.     History (predisposing factors):  No known lung disease.     Precipitating or alleviating factors: None    Therapies tried and outcome:  rest and fluids Theraflu    She did not get a flu shot this season.     Problem list and histories reviewed & adjusted, as indicated.  Additional history: as documented    Patient Active Problem List   Diagnosis     Cardiomyopathy (H)     CHF, acute on chronic (H)     Diabetes mellitus, type II (H)     Diverticulosis of large intestine     CHANG (dyspnea on exertion)     Elevated cholesterol     VSD (ventricular septal defect)     Essential hypertension     Mitral regurgitation     Morbid obesity with BMI of 40.0-44.9, adult (H)     S/P ascending aortic aneurysm repair     S/P AVR (aortic valve replacement)     S/P mitral valve repair     Special screening for malignant neoplasms, colon     Vaginitis     Tachycardia     Past Surgical History:   Procedure Laterality Date     COLONOSCOPY  09/12/2007 09/07,Follow up recommended in five years.     COLONOSCOPY  09/01/2016     COLONOSCOPY  10/18/2012     LAPAROSCOPIC TUBAL LIGATION      No Comments Provided     OTHER SURGICAL  HISTORY      ,16230.0,VT REPLACE AORTIC VALVE OPEN W STENTLESS TISSUE VALVE,Pacemaker placed as well     TONSILLECTOMY, ADENOIDECTOMY, COMBINED       as a child       Social History     Tobacco Use     Smoking status: Former Smoker     Packs/day: 0.50     Years: 20.00     Pack years: 10.00     Smokeless tobacco: Never Used   Substance Use Topics     Alcohol use: No     Alcohol/week: 0.0 oz     Family History   Problem Relation Age of Onset     Cancer Mother         Cancer, age 74 of liver cancer with a history of CAD     Heart Disease Mother         Heart Disease,history of CAD     Unknown/Adopted Father         Unknown,Unknown     Diabetes Other         Diabetes,Negative for diabetes mellitus     Breast Cancer Other         Cancer-breast     Blood Disease Sister         Blood Disease,Leukemia         Current Outpatient Medications   Medication Sig Dispense Refill     aspirin 81 MG chewable tablet Take 81 mg by mouth daily with food       B-D ULTRA-FINE 33 LANCETS MISC 2 times daily       benzonatate (TESSALON) 200 MG capsule Take 1 capsule (200 mg) by mouth 3 times daily as needed for cough 30 capsule 0     bisoprolol (ZEBETA) 5 MG tablet Take 5 mg by mouth       bisoprolol (ZEBETA) 5 MG tablet Take 5 mg by mouth 2 times daily       blood glucose monitoring (NO BRAND SPECIFIED) test strip 2 times daily. Dispense item covered by pt ins. Type 2 diabetes mellitus without insulin       Blood Pressure Monitoring (BLOOD PRESSURE KIT) KIT        Blood Pressure Monitoring (RA BLOOD PRESSURE CUFF MONITOR) MISC        guaiFENesin-codeine (ROBITUSSIN AC) 100-10 MG/5ML solution Take 10 mLs by mouth every 6 hours as needed for cough 160 mL 0     lisinopril (PRINIVIL/ZESTRIL) 10 MG tablet Take 10 mg by mouth       lisinopril (PRINIVIL/ZESTRIL) 10 MG tablet Take 10 mg by mouth daily       metFORMIN (GLUCOPHAGE) 1000 MG tablet TAKE 1 TABLET BY MOUTH TWICE DAILY WITH MEALS 180 tablet 3     simvastatin (ZOCOR) 10 MG tablet  "Take 10 mg by mouth       simvastatin (ZOCOR) 10 MG tablet Take 10 mg by mouth At Bedtime       spironolactone (ALDACTONE) 25 MG tablet TAKE 1 TABLET BY MOUTH EVERY DAY 90 tablet 3     Allergies   Allergen Reactions     Azithromycin Rash         ROS:  Notable findings in the HPI.       OBJECTIVE:     /62 (BP Location: Left arm, Patient Position: Sitting, Cuff Size: Adult Large)   Pulse 60   Temp 99.6  F (37.6  C) (Tympanic)   Resp 24   Ht 1.676 m (5' 6\")   Wt 118.8 kg (262 lb)   SpO2 95%   Breastfeeding? No   BMI 42.29 kg/m    Body mass index is 42.29 kg/m .  GENERAL: healthy, alert and no distress  EYES: Eyes grossly normal to inspection  HENT: normal cephalic/atraumatic, right ear: normal: no effusions, no erythema, normal landmarks, left ear: normal: no effusions, no erythema, normal landmarks, nose and mouth without ulcers or lesions, nasal mucosa edematous , rhinorrhea clear, oropharynx clear, oral mucous membranes moist and sinuses: not tender  NECK: no adenopathy  RESP: lungs clear to auscultation - no rales or rhonchi mild expiratory wheezes and dry cough noted  CV: regular rates and rhythm, normal S1 S2, no S3 or S4, no murmur, click or rub, peripheral pulses strong and no peripheral edema  SKIN: no suspicious lesions or rashes  PSYCH: mentation appears normal, affect normal/bright    Diagnostic Test Results:  none     ASSESSMENT/PLAN:     1. Viral URI with cough  - guaiFENesin-codeine (ROBITUSSIN AC) 100-10 MG/5ML solution; Take 10 mLs by mouth every 6 hours as needed for cough  Dispense: 160 mL; Refill: 0  - benzonatate (TESSALON) 200 MG capsule; Take 1 capsule (200 mg) by mouth 3 times daily as needed for cough  Dispense: 30 capsule; Refill: 0      PLAN:    URI Adult:  Tylenol, Ibuprofen, Fluids, Rest, OTC cough suppressant/expectorant, OTC decongestant/antihistamine, Saline gargles, Saline nasal spray, Vaporizer and signs and symptoms suggestive of a viral bronchitis.  Prescription " medications are gone over, length of illness is gone over along with symptom resolution timeline.  Follow-up with primary if symptoms are not slowly improving in the next 10 days.    Followup:    If not improving or if condition worsens, follow up with your Primary Care Provider    I explained my diagnostic considerations and recommendations to the patient, who voiced understanding and agreement with the treatment plan. All questions were answered. We discussed potential side effects of any prescribed or recommended therapies, as well as expectations for response to treatments. She was advised to contact our office if there is no improvement or worsening of conditions or symptoms.  If s/s worsen or persist, patient will either come back or follow up with PCP.    Disclaimer:  This note consists of words and symbols derived from keyboarding, dictation, or using voice recognition software. As a result, there may be errors in the script that have gone undetected. Please consider this when interpreting information found in this note.      Roberta Lorenzo NP, 3/18/2019 1:02 PM

## 2019-03-18 NOTE — NURSING NOTE
Patient in clinic for cough, nasal congestion, and headache x 5 days. Wants to be sure she is not contagious, so she can go back to work.  Tx with Shavonne Esparza LPN....................  3/18/2019   12:44 PM    Chief Complaint   Patient presents with     Cough     Nasal Congestion     Headache       Medication Reconciliation: complete    Joyce Esparza LPN

## 2019-03-18 NOTE — PATIENT INSTRUCTIONS

## 2019-04-26 ENCOUNTER — HOSPITAL ENCOUNTER (OUTPATIENT)
Dept: MAMMOGRAPHY | Facility: OTHER | Age: 64
Discharge: HOME | End: 2019-04-26
Attending: FAMILY MEDICINE | Admitting: FAMILY MEDICINE
Payer: COMMERCIAL

## 2019-04-26 DIAGNOSIS — Z12.31 VISIT FOR SCREENING MAMMOGRAM: ICD-10-CM

## 2019-04-26 PROCEDURE — 77067 SCR MAMMO BI INCL CAD: CPT

## 2019-05-10 ENCOUNTER — OFFICE VISIT (OUTPATIENT)
Dept: FAMILY MEDICINE | Facility: OTHER | Age: 64
End: 2019-05-10
Attending: FAMILY MEDICINE
Payer: COMMERCIAL

## 2019-05-10 VITALS
HEIGHT: 67 IN | OXYGEN SATURATION: 100 % | TEMPERATURE: 97.6 F | HEART RATE: 58 BPM | RESPIRATION RATE: 14 BRPM | BODY MASS INDEX: 41.47 KG/M2 | WEIGHT: 264.2 LBS | SYSTOLIC BLOOD PRESSURE: 138 MMHG | DIASTOLIC BLOOD PRESSURE: 72 MMHG

## 2019-05-10 DIAGNOSIS — E11.9 TYPE 2 DIABETES MELLITUS WITHOUT COMPLICATION, WITHOUT LONG-TERM CURRENT USE OF INSULIN (H): ICD-10-CM

## 2019-05-10 DIAGNOSIS — I10 ESSENTIAL HYPERTENSION: ICD-10-CM

## 2019-05-10 DIAGNOSIS — Z00.00 ROUTINE GENERAL MEDICAL EXAMINATION AT A HEALTH CARE FACILITY: Primary | ICD-10-CM

## 2019-05-10 LAB
ANION GAP SERPL CALCULATED.3IONS-SCNC: 7 MMOL/L (ref 3–14)
BUN SERPL-MCNC: 26 MG/DL (ref 7–25)
CALCIUM SERPL-MCNC: 9.4 MG/DL (ref 8.6–10.3)
CHLORIDE SERPL-SCNC: 108 MMOL/L (ref 98–107)
CHOLEST SERPL-MCNC: 144 MG/DL
CO2 SERPL-SCNC: 24 MMOL/L (ref 21–31)
CREAT SERPL-MCNC: 1.82 MG/DL (ref 0.6–1.2)
CREAT UR-MCNC: 208 MG/DL
GFR SERPL CREATININE-BSD FRML MDRD: 28 ML/MIN/{1.73_M2}
GLUCOSE SERPL-MCNC: 168 MG/DL (ref 70–105)
HBA1C MFR BLD: 7.6 % (ref 4–6)
HDLC SERPL-MCNC: 47 MG/DL (ref 23–92)
LDLC SERPL CALC-MCNC: 75 MG/DL
MICROALBUMIN UR-MCNC: 20 MG/L
MICROALBUMIN/CREAT UR: 9.52 MG/G CR (ref 0–25)
NONHDLC SERPL-MCNC: 97 MG/DL
POTASSIUM SERPL-SCNC: 5.3 MMOL/L (ref 3.5–5.1)
SODIUM SERPL-SCNC: 139 MMOL/L (ref 134–144)
TRIGL SERPL-MCNC: 109 MG/DL
TSH SERPL DL<=0.05 MIU/L-ACNC: 2.25 IU/ML (ref 0.34–5.6)

## 2019-05-10 PROCEDURE — 83036 HEMOGLOBIN GLYCOSYLATED A1C: CPT | Mod: ZL | Performed by: FAMILY MEDICINE

## 2019-05-10 PROCEDURE — 99396 PREV VISIT EST AGE 40-64: CPT | Mod: 25 | Performed by: FAMILY MEDICINE

## 2019-05-10 PROCEDURE — 90732 PPSV23 VACC 2 YRS+ SUBQ/IM: CPT | Performed by: FAMILY MEDICINE

## 2019-05-10 PROCEDURE — 80061 LIPID PANEL: CPT | Mod: ZL | Performed by: FAMILY MEDICINE

## 2019-05-10 PROCEDURE — 36415 COLL VENOUS BLD VENIPUNCTURE: CPT | Mod: ZL | Performed by: FAMILY MEDICINE

## 2019-05-10 PROCEDURE — 82043 UR ALBUMIN QUANTITATIVE: CPT | Mod: ZL | Performed by: FAMILY MEDICINE

## 2019-05-10 PROCEDURE — 84443 ASSAY THYROID STIM HORMONE: CPT | Mod: ZL | Performed by: FAMILY MEDICINE

## 2019-05-10 PROCEDURE — 90471 IMMUNIZATION ADMIN: CPT | Performed by: FAMILY MEDICINE

## 2019-05-10 PROCEDURE — 80048 BASIC METABOLIC PNL TOTAL CA: CPT | Mod: ZL | Performed by: FAMILY MEDICINE

## 2019-05-10 ASSESSMENT — ANXIETY QUESTIONNAIRES
1. FEELING NERVOUS, ANXIOUS, OR ON EDGE: NOT AT ALL
3. WORRYING TOO MUCH ABOUT DIFFERENT THINGS: NOT AT ALL
6. BECOMING EASILY ANNOYED OR IRRITABLE: NOT AT ALL
IF YOU CHECKED OFF ANY PROBLEMS ON THIS QUESTIONNAIRE, HOW DIFFICULT HAVE THESE PROBLEMS MADE IT FOR YOU TO DO YOUR WORK, TAKE CARE OF THINGS AT HOME, OR GET ALONG WITH OTHER PEOPLE: NOT DIFFICULT AT ALL
7. FEELING AFRAID AS IF SOMETHING AWFUL MIGHT HAPPEN: NOT AT ALL
2. NOT BEING ABLE TO STOP OR CONTROL WORRYING: NOT AT ALL
5. BEING SO RESTLESS THAT IT IS HARD TO SIT STILL: NOT AT ALL
GAD7 TOTAL SCORE: 0

## 2019-05-10 ASSESSMENT — MIFFLIN-ST. JEOR: SCORE: 1783.09

## 2019-05-10 ASSESSMENT — PAIN SCALES - GENERAL: PAINLEVEL: NO PAIN (0)

## 2019-05-10 ASSESSMENT — PATIENT HEALTH QUESTIONNAIRE - PHQ9: 5. POOR APPETITE OR OVEREATING: NOT AT ALL

## 2019-05-10 NOTE — PROGRESS NOTES
SUBJECTIVE:   CC: Glenys Ladd is an 63 year old woman who presents for preventive health visit.     Healthy Habits:    Do you get at least three servings of calcium containing foods daily (dairy, green leafy vegetables, etc.)? yes    Amount of exercise or daily activities, outside of work: 3-4 day(s) per week    Problems taking medications regularly No    Medication side effects: No    Have you had an eye exam in the past two years? no    Do you see a dentist twice per year? yes    Do you have sleep apnea, excessive snoring or daytime drowsiness?no      -------------------------------------    Today's PHQ-2 Score:   PHQ-2 ( 1999 [employer]) 5/10/2019 3/18/2019   Q1: Little interest or pleasure in doing things 0 0   Q2: Feeling down, depressed or hopeless 0 0   PHQ-2 Score 0 0       Abuse: Current or Past(Physical, Sexual or Emotional)- No  Do you feel safe in your environment? Yes    Social History     Tobacco Use     Smoking status: Former Smoker     Packs/day: 0.50     Years: 20.00     Pack years: 10.00     Smokeless tobacco: Never Used   Substance Use Topics     Alcohol use: No     Alcohol/week: 0.0 oz     If you drink alcohol do you typically have >3 drinks per day or >7 drinks per week? No                     Reviewed orders with patient.  Reviewed health maintenance and updated orders accordingly - Yes  Labs reviewed in [facility]    Mammogram Screening: Patient over age 50, mutual decision to screen reflected in health maintenance.    Pertinent mammograms are reviewed under the imaging tab.  History of abnormal Pap smear: NO - age 30- 65 PAP every 3 years recommended     Reviewed and updated as needed this visit by clinical staff  Tobacco  Allergies  Meds  Soc Hx        Reviewed and updated as needed this visit by Provider        Past Medical History:   Diagnosis Date     Acute vaginitis     10/27/2010     Cardiomyopathy (H)     7/18/2014     Diverticulosis of large intestine without perforation or abscess  without bleeding     No Comments Provided     Female climacteric state     10/27/2010     Heart failure (H)     7/25/2014,With systolic dysfunction EF 20-25% by transthroacic echocardiogram 7/2014.     Nonrheumatic aortic valve disorder     last ultrasound was april 2005     Nonrheumatic aortic valve disorder     last ultrasound was april 2014     Nonrheumatic mitral valve insufficiency     7/18/2014,- echo April 2014: moderate MR     Other forms of dyspnea     7/18/2014     Other specified disorders of arteries and arterioles (CODE)     7/17/2014,- echo April 2014: Ascending aorta 4.6 cm     Other specified postprocedural states     7/21/2014,Repair Ascending Aortic Aneurysm with a 30 mm Gelweave Graft     Other specified postprocedural states     7/21/2014,Mitral Valve Ring Annuloplasty with a 32 mm Rigid Saddle Ring     Presence of prosthetic heart valve     7/21/2014,Aortic Valve Replacement with a 25 mm Magna Valve     Pure hypercholesterolemia     9/19/2013     Tachycardia     7/17/2014     Type 2 diabetes mellitus without complications (H)     10/27/2010     Ventricular septal defect     7/18/2014,- apparent hx of VSD that had spontaneous closure at age 17 when pregnant (no documentation)      Past Surgical History:   Procedure Laterality Date     COLONOSCOPY  09/12/2007 09/07,Follow up recommended in five years.     COLONOSCOPY  09/01/2016    normal, 10 year follow up     COLONOSCOPY  10/18/2012     LAPAROSCOPIC TUBAL LIGATION      No Comments Provided     OTHER SURGICAL HISTORY      7/14,41592.0,MO REPLACE AORTIC VALVE OPEN W STENTLESS TISSUE VALVE,Pacemaker placed as well     TONSILLECTOMY, ADENOIDECTOMY, COMBINED       as a child     OB History   No data available       ROS:  CONSTITUTIONAL: NEGATIVE for fever, chills, change in weight  INTEGUMENTARY/SKIN: NEGATIVE for worrisome rashes, moles or lesions  EYES: NEGATIVE for vision changes or irritation  ENT: NEGATIVE for ear, mouth and throat  "problems  RESP: NEGATIVE for significant cough or SOB  BREAST: NEGATIVE for masses, tenderness or discharge  CV: NEGATIVE for chest pain, palpitations or peripheral edema  GI: NEGATIVE for nausea, abdominal pain, heartburn, or change in bowel habits  : NEGATIVE for unusual urinary or vaginal symptoms. No vaginal bleeding.  MUSCULOSKELETAL: NEGATIVE for significant arthralgias or myalgia  NEURO: NEGATIVE for weakness, dizziness or paresthesias  PSYCHIATRIC: NEGATIVE for changes in mood or affect     OBJECTIVE:   /72 (BP Location: Right arm, Patient Position: Sitting, Cuff Size: Adult Large)   Pulse 58   Temp 97.6  F (36.4  C) (Tympanic)   Resp 14   Ht 1.689 m (5' 6.5\")   Wt 119.8 kg (264 lb 3.2 oz)   SpO2 100%   BMI 42.00 kg/m    EXAM:  GENERAL APPEARANCE: healthy, alert and no distress  EYES: Eyes grossly normal to inspection, PERRL and conjunctivae and sclerae normal  HENT: ear canals and TM's normal, nose and mouth without ulcers or lesions, oropharynx clear and oral mucous membranes moist  NECK: no adenopathy, no asymmetry, masses, or scars and thyroid normal to palpation  RESP: lungs clear to auscultation - no rales, rhonchi or wheezes  BREAST: normal without masses, tenderness or nipple discharge and no palpable axillary masses or adenopathy  CV: regular rate and rhythm, normal S1 S2, no S3 or S4, early grade 2 systolic murmur best heard over right sternal boarder 2nd intercostal space, click or rub, no peripheral edema and peripheral pulses strong  ABDOMEN: soft, nontender, no hepatosplenomegaly, no masses and bowel sounds normal  MS: no musculoskeletal defects are noted and gait is age appropriate without ataxia  SKIN: no suspicious lesions or rashes  NEURO: Normal strength and tone, sensory exam grossly normal, mentation intact and speech normal  PSYCH: mentation appears normal and affect normal/bright    Diagnostic Test Results:  none     ASSESSMENT/PLAN:   (Z00.00) Routine general medical " "examination at a health care facility  (primary encounter diagnosis)  Comment: Stable, no issues with medications. Up to date with all screenings  Plan: follow up in 1 year     (E11.9) Type 2 diabetes mellitus without complication, without long-term current use of insulin (H)  Comment: Last HgbA1c was 7.5, will add additional medication should this be above 8.  Plan: Continue with metformin 1000 mg daily, follow up in 6 months for diabetes check.    (I10) Essential hypertension  Comment: Initially high to day with systolic in the high 130s, however on recheck, BP was 124/82  Plan: Continue with current medications    COUNSELING:   Reviewed preventive health counseling, as reflected in patient instructions       Regular exercise       Healthy diet/nutrition    BP Readings from Last 1 Encounters:   05/10/19 138/72     Estimated body mass index is 42 kg/m  as calculated from the following:    Height as of this encounter: 1.689 m (5' 6.5\").    Weight as of this encounter: 119.8 kg (264 lb 3.2 oz).    BP Screening:   Last 3 BP Readings:    BP Readings from Last 3 Encounters:   05/10/19 138/72   03/18/19 104/62   11/16/17 126/70       The following was recommended to the patient:  Re-screen BP within a year and recommended lifestyle modifications  Weight management plan: Patient referred to endocrine and/or weight management specialty Discussed healthy diet and exercise guidelines     reports that she has quit smoking. She has a 10.00 pack-year smoking history. She has never used smokeless tobacco.      Counseling Resources:  ATP IV Guidelines  Pooled Cohorts Equation Calculator  Breast Cancer Risk Calculator  FRAX Risk Assessment  ICSI Preventive Guidelines  Dietary Guidelines for Americans, 2010  USDA's MyPlate  ASA Prophylaxis  Lung CA Screening    Kenan Garcia MS3  [facility]    Pt was seen by me as well as Kenan Garcia, MS3.  I was present for the exam and medical decision making, and I confirmed the " complete history.    Ton Chaudhry MD on 5/13/2019 at 8:03 AM

## 2019-05-10 NOTE — LETTER
May 10, 2019      Glenys Ladd  [address] [place] [address]        Dear Glenys,     There has been a decline in your kidney function (creatinint and GFR) by about 50% from last year.  This can be from a lot of things, including diabetes.  Continued use of ibuprofen or naproxen can also cause it.  I would like to see you back soon to dig into it a bit.    Results for orders placed or performed in visit on 05/10/19   HEMOGLOBIN A1C   Result Value Ref Range    Hemoglobin A1C 7.6 (H) 4.0 - 6.0 %   BASIC METABOLIC PANEL   Result Value Ref Range    Sodium 139 134 - 144 mmol/L    Potassium 5.3 (H) 3.5 - 5.1 mmol/L    Chloride 108 (H) 98 - 107 mmol/L    Carbon Dioxide 24 21 - 31 mmol/L    Anion Gap 7 3 - 14 mmol/L    Glucose 168 (H) 70 - 105 mg/dL    Urea Nitrogen 26 (H) 7 - 25 mg/dL    Creatinine 1.82 (H) 0.60 - 1.20 mg/dL    GFR Estimate 28 (L) >60 mL/min/[1.73_m2]    GFR Estimate If Black 34 (L) >60 mL/min/[1.73_m2]    Calcium 9.4 8.6 - 10.3 mg/dL   TSH   Result Value Ref Range    Thyrotropin 2.25 0.34 - 5.60 IU/mL   Lipid Profile   Result Value Ref Range    Cholesterol 144 <200 mg/dL    Triglycerides 109 <150 mg/dL    HDL Cholesterol 47 23 - 92 mg/dL    LDL Cholesterol Calculated 75 <100 mg/dL    Non HDL Cholesterol 97 <130 mg/dL         Sincerely,        Ton Chaudhry MD

## 2019-05-10 NOTE — NURSING NOTE
"coming in for a physical    Chief Complaint   Patient presents with     Physical     check up       Initial /72 (BP Location: Right arm, Patient Position: Sitting, Cuff Size: Adult Large)   Pulse 58   Resp 14   Ht 1.689 m (5' 6.5\")   Wt 119.8 kg (264 lb 3.2 oz)   SpO2 100%   BMI 42.00 kg/m   Estimated body mass index is 42 kg/m  as calculated from the following:    Height as of this encounter: 1.689 m (5' 6.5\").    Weight as of this encounter: 119.8 kg (264 lb 3.2 oz).  Medication Reconciliation: complete    Chiquita Reddy LPN  "

## 2019-05-11 ASSESSMENT — ANXIETY QUESTIONNAIRES: GAD7 TOTAL SCORE: 0

## 2019-11-19 ENCOUNTER — TRANSFERRED RECORDS (OUTPATIENT)
Dept: HEALTH INFORMATION MANAGEMENT | Facility: OTHER | Age: 64
End: 2019-11-19

## 2020-01-16 DIAGNOSIS — E11.9 TYPE 2 DIABETES MELLITUS WITHOUT COMPLICATION, WITHOUT LONG-TERM CURRENT USE OF INSULIN (H): Primary | ICD-10-CM

## 2020-01-16 NOTE — LETTER
January 20, 2020      Glenys Ladd  [address]  [facility] [address]        Dear Glenys,     This letter is to remind you that you are over-due for your 6-month diabetic exam with Ton Chaudhry.     Please call the clinic at [phone] to schedule your appointment.    If you are no longer seeing Ton Chaudhry for primary care, please call to let us know. Doing so will remove you from our call/contact list.    Thank you for choosing [facility] and [facility] for your health care needs.    Sincerely,    Refill BRITTNY  [facility]

## 2020-01-17 NOTE — TELEPHONE ENCOUNTER
"Refill request for Metformin 1000 MG tablets.  Last office visit with pcp on 5/10/2019 with note to follow up in 1 year then for diabetes it says, \"Plan: Continue with metformin 1000 mg daily, follow up in 6 months for diabetes check\", last refill 12/12/2018.  Last A1c 5/10/19.  Left message for patient to call back to get scheduled for diabetes check, then will give 90 day supply.  Maria Elena Nicole RN on 1/17/2020 at 12:17 PM    "

## 2020-01-20 NOTE — TELEPHONE ENCOUNTER
Unable to reach Patient. Reminder letter sent. Will route to PCP for consideration of 90-day refill. Haylie Priest RN .............. 1/20/2020  9:52 AM

## 2020-06-02 DIAGNOSIS — I10 ESSENTIAL HYPERTENSION: ICD-10-CM

## 2020-06-02 NOTE — LETTER
Joyce 3, 2020      Glenys Ladd  [address]  [place] [address]        Dear Glenys,     A refill request was received from your pharmacy for Spironolactone.    Additional refills require an office visit with Dr. Chaudhry for annual review and labs.    Please call [phone] to schedule appointment.          Sincerely,      Refill Nurse

## 2020-06-03 RX ORDER — SPIRONOLACTONE 25 MG/1
TABLET ORAL
Qty: 90 TABLET | Refills: 0 | Status: SHIPPED | OUTPATIENT
Start: 2020-06-03 | End: 2020-08-14

## 2020-06-03 NOTE — TELEPHONE ENCOUNTER
Routing refill request to provider for review/approval because:  Labs out of range:    Labs not current:    Patient needs to be seen because it has been more than 1 year since last office visit.      LOV: and labs: 5/10/2019    Letter sent    Divine Laboy RN on 6/3/2020 at 10:35 AM

## 2020-08-14 RX ORDER — LISINOPRIL 10 MG/1
10 TABLET ORAL DAILY
COMMUNITY
Start: 2019-12-03 | End: 2020-08-14

## 2020-08-14 RX ORDER — SIMVASTATIN 10 MG
10 TABLET ORAL DAILY
COMMUNITY
Start: 2019-12-03 | End: 2020-08-14

## 2020-08-14 RX ORDER — BISOPROLOL FUMARATE 5 MG/1
5 TABLET, FILM COATED ORAL 2 TIMES DAILY
COMMUNITY
Start: 2020-01-07 | End: 2020-08-14

## 2020-08-17 ENCOUNTER — OFFICE VISIT (OUTPATIENT)
Dept: FAMILY MEDICINE | Facility: OTHER | Age: 65
End: 2020-08-17
Attending: FAMILY MEDICINE
Payer: COMMERCIAL

## 2020-08-17 VITALS
DIASTOLIC BLOOD PRESSURE: 74 MMHG | WEIGHT: 262 LBS | RESPIRATION RATE: 16 BRPM | HEART RATE: 66 BPM | HEIGHT: 67 IN | TEMPERATURE: 97.3 F | OXYGEN SATURATION: 97 % | BODY MASS INDEX: 41.12 KG/M2 | SYSTOLIC BLOOD PRESSURE: 138 MMHG

## 2020-08-17 DIAGNOSIS — E11.9 TYPE 2 DIABETES MELLITUS WITHOUT COMPLICATION, WITHOUT LONG-TERM CURRENT USE OF INSULIN (H): ICD-10-CM

## 2020-08-17 DIAGNOSIS — I10 ESSENTIAL HYPERTENSION: ICD-10-CM

## 2020-08-17 DIAGNOSIS — Z00.00 ROUTINE GENERAL MEDICAL EXAMINATION AT A HEALTH CARE FACILITY: Primary | ICD-10-CM

## 2020-08-17 DIAGNOSIS — Z12.4 SCREENING FOR MALIGNANT NEOPLASM OF CERVIX: ICD-10-CM

## 2020-08-17 LAB
ALT SERPL W P-5'-P-CCNC: 16 U/L (ref 7–52)
ANION GAP SERPL CALCULATED.3IONS-SCNC: 6 MMOL/L (ref 3–14)
BUN SERPL-MCNC: 27 MG/DL (ref 7–25)
CALCIUM SERPL-MCNC: 9.3 MG/DL (ref 8.6–10.3)
CHLORIDE SERPL-SCNC: 106 MMOL/L (ref 98–107)
CHOLEST SERPL-MCNC: 140 MG/DL
CO2 SERPL-SCNC: 27 MMOL/L (ref 21–31)
CREAT SERPL-MCNC: 1.98 MG/DL (ref 0.6–1.2)
CREAT UR-MCNC: 99 MG/DL
ERYTHROCYTE [DISTWIDTH] IN BLOOD BY AUTOMATED COUNT: 12.5 % (ref 10–15)
GFR SERPL CREATININE-BSD FRML MDRD: 25 ML/MIN/{1.73_M2}
GLUCOSE SERPL-MCNC: 152 MG/DL (ref 70–105)
HBA1C MFR BLD: 8.4 % (ref 4–6)
HCT VFR BLD AUTO: 36.1 % (ref 35–47)
HDLC SERPL-MCNC: 42 MG/DL (ref 23–92)
HGB BLD-MCNC: 11.2 G/DL (ref 11.7–15.7)
LDLC SERPL CALC-MCNC: 74 MG/DL
MCH RBC QN AUTO: 28.4 PG (ref 26.5–33)
MCHC RBC AUTO-ENTMCNC: 31 G/DL (ref 31.5–36.5)
MCV RBC AUTO: 92 FL (ref 78–100)
MICROALBUMIN UR-MCNC: 7 MG/L
MICROALBUMIN/CREAT UR: 7.07 MG/G CR (ref 0–25)
NONHDLC SERPL-MCNC: 98 MG/DL
PLATELET # BLD AUTO: 186 10E9/L (ref 150–450)
POTASSIUM SERPL-SCNC: 5 MMOL/L (ref 3.5–5.1)
RBC # BLD AUTO: 3.94 10E12/L (ref 3.8–5.2)
SODIUM SERPL-SCNC: 139 MMOL/L (ref 134–144)
TRIGL SERPL-MCNC: 119 MG/DL
WBC # BLD AUTO: 5.7 10E9/L (ref 4–11)

## 2020-08-17 PROCEDURE — 99396 PREV VISIT EST AGE 40-64: CPT | Mod: 25 | Performed by: FAMILY MEDICINE

## 2020-08-17 PROCEDURE — 36415 COLL VENOUS BLD VENIPUNCTURE: CPT | Mod: ZL | Performed by: FAMILY MEDICINE

## 2020-08-17 PROCEDURE — 84460 ALANINE AMINO (ALT) (SGPT): CPT | Mod: ZL | Performed by: FAMILY MEDICINE

## 2020-08-17 PROCEDURE — G0123 SCREEN CERV/VAG THIN LAYER: HCPCS | Performed by: FAMILY MEDICINE

## 2020-08-17 PROCEDURE — 82043 UR ALBUMIN QUANTITATIVE: CPT | Mod: ZL | Performed by: FAMILY MEDICINE

## 2020-08-17 PROCEDURE — 80061 LIPID PANEL: CPT | Mod: ZL | Performed by: FAMILY MEDICINE

## 2020-08-17 PROCEDURE — 88142 CYTOPATH C/V THIN LAYER: CPT | Performed by: FAMILY MEDICINE

## 2020-08-17 PROCEDURE — 83036 HEMOGLOBIN GLYCOSYLATED A1C: CPT | Mod: ZL | Performed by: FAMILY MEDICINE

## 2020-08-17 PROCEDURE — 90715 TDAP VACCINE 7 YRS/> IM: CPT | Performed by: FAMILY MEDICINE

## 2020-08-17 PROCEDURE — 80048 BASIC METABOLIC PNL TOTAL CA: CPT | Mod: ZL | Performed by: FAMILY MEDICINE

## 2020-08-17 PROCEDURE — 85027 COMPLETE CBC AUTOMATED: CPT | Mod: ZL | Performed by: FAMILY MEDICINE

## 2020-08-17 PROCEDURE — 90471 IMMUNIZATION ADMIN: CPT | Performed by: FAMILY MEDICINE

## 2020-08-17 PROCEDURE — 87624 HPV HI-RISK TYP POOLED RSLT: CPT | Mod: ZL | Performed by: FAMILY MEDICINE

## 2020-08-17 RX ORDER — SIMVASTATIN 10 MG
10 TABLET ORAL DAILY
Qty: 90 TABLET | Refills: 3 | Status: SHIPPED | OUTPATIENT
Start: 2020-08-17 | End: 2021-08-06

## 2020-08-17 RX ORDER — BLOOD-GLUCOSE METER
1 KIT MISCELLANEOUS 2 TIMES DAILY
Qty: 200 EACH | Refills: 6 | Status: SHIPPED | OUTPATIENT
Start: 2020-08-17 | End: 2021-10-14

## 2020-08-17 RX ORDER — BISOPROLOL FUMARATE 5 MG/1
5 TABLET, FILM COATED ORAL 2 TIMES DAILY
Qty: 180 TABLET | Refills: 3 | Status: SHIPPED | OUTPATIENT
Start: 2020-08-17 | End: 2021-08-24

## 2020-08-17 RX ORDER — SPIRONOLACTONE 25 MG/1
25 TABLET ORAL DAILY
Qty: 90 TABLET | Refills: 3 | Status: SHIPPED | OUTPATIENT
Start: 2020-08-17 | End: 2021-08-06

## 2020-08-17 RX ORDER — LISINOPRIL 10 MG/1
10 TABLET ORAL DAILY
Qty: 90 TABLET | Refills: 3 | Status: SHIPPED | OUTPATIENT
Start: 2020-08-17 | End: 2021-08-06

## 2020-08-17 ASSESSMENT — ANXIETY QUESTIONNAIRES
IF YOU CHECKED OFF ANY PROBLEMS ON THIS QUESTIONNAIRE, HOW DIFFICULT HAVE THESE PROBLEMS MADE IT FOR YOU TO DO YOUR WORK, TAKE CARE OF THINGS AT HOME, OR GET ALONG WITH OTHER PEOPLE: NOT DIFFICULT AT ALL
7. FEELING AFRAID AS IF SOMETHING AWFUL MIGHT HAPPEN: NOT AT ALL
5. BEING SO RESTLESS THAT IT IS HARD TO SIT STILL: NOT AT ALL
2. NOT BEING ABLE TO STOP OR CONTROL WORRYING: NOT AT ALL
3. WORRYING TOO MUCH ABOUT DIFFERENT THINGS: NOT AT ALL
1. FEELING NERVOUS, ANXIOUS, OR ON EDGE: NOT AT ALL
GAD7 TOTAL SCORE: 0
6. BECOMING EASILY ANNOYED OR IRRITABLE: NOT AT ALL

## 2020-08-17 ASSESSMENT — PATIENT HEALTH QUESTIONNAIRE - PHQ9: 5. POOR APPETITE OR OVEREATING: NOT AT ALL

## 2020-08-17 ASSESSMENT — MIFFLIN-ST. JEOR: SCORE: 1776.05

## 2020-08-17 ASSESSMENT — PAIN SCALES - GENERAL: PAINLEVEL: NO PAIN (0)

## 2020-08-17 NOTE — PROGRESS NOTES
SUBJECTIVE:   CC: Glenys Ladd is an 64 year old woman who presents for preventive health visit.     Healthy Habits:    Do you get at least three servings of calcium containing foods daily (dairy, green leafy vegetables, etc.)? tries    Amount of exercise or daily activities, outside of work: 7 day(s) per week    Problems taking medications regularly No    Medication side effects: No    Have you had an eye exam in the past two years? no    Do you see a dentist twice per year? yes    Do you have sleep apnea, excessive snoring or daytime drowsiness?no      Follow up on diabetes.  Checks about 4-5 times a week.  Always under 150 most are in 120's.  Has a pacer, gets this interrogated every 6 months.  Last echo was 5/29/15 with EF at 56%.  Was 20-25% prior to the valve replacement.     Recent Labs   Lab Test 05/10/19  0904 11/16/17  1126  07/15/16  1045   A1C 7.6*  --   --   --    LDL 75 59  --  70   HDL 47 45  --  43   TRIG 109 177*  --  115   CR 1.82* 1.21   < > 1.24   GFRESTIMATED 28*  --   --   --    GFRESTBLACK 34* 55*   < > 54*   POTASSIUM 5.3* 4.8   < > 4.4    < > = values in this interval not displayed.              Today's PHQ-2 Score:   PHQ-2 ( 1999 [employer]) 8/17/2020 5/10/2019   Q1: Little interest or pleasure in doing things 0 0   Q2: Feeling down, depressed or hopeless 0 0   PHQ-2 Score 0 0       Abuse: Current or Past(Physical, Sexual or Emotional)- No  Do you feel safe in your environment? Yes        Social History     Tobacco Use     Smoking status: Former Smoker     Packs/day: 0.50     Years: 20.00     Pack years: 10.00     Smokeless tobacco: Never Used   Substance Use Topics     Alcohol use: No     Alcohol/week: 0.0 standard drinks     If you drink alcohol do you typically have >3 drinks per day or >7 drinks per week? No                     Reviewed orders with patient.  Reviewed health maintenance and updated orders accordingly - Yes  Lab work is in process  Labs reviewed in EPIC  Current  Outpatient Medications   Medication Sig Dispense Refill     aspirin 81 MG chewable tablet Take 81 mg by mouth daily with food       B-D ULTRA-FINE 33 LANCETS MISC 1 each by In Vitro route 2 times daily 200 each 6     bisoprolol (ZEBETA) 5 MG tablet Take 1 tablet (5 mg) by mouth 2 times daily 180 tablet 3     blood glucose (NO BRAND SPECIFIED) test strip 2 times daily. Dispense item covered by pt ins. Type 2 diabetes mellitus without insulin 50 strip 11     blood glucose monitoring (NO BRAND SPECIFIED) meter device kit Use to test blood sugar 1 times daily or as directed. 1 kit 0     Blood Pressure Monitoring (BLOOD PRESSURE KIT) KIT        Blood Pressure Monitoring (RA BLOOD PRESSURE CUFF MONITOR) MISC        guaiFENesin-codeine (ROBITUSSIN AC) 100-10 MG/5ML solution Take 10 mLs by mouth every 6 hours as needed for cough 160 mL 0     lisinopril (ZESTRIL) 10 MG tablet Take 1 tablet (10 mg) by mouth daily 90 tablet 3     metFORMIN (GLUCOPHAGE) 1000 MG tablet TAKE 1 TABLET BY MOUTH TWICE DAILY WITH MEALS 180 tablet 3     simvastatin (ZOCOR) 10 MG tablet Take 1 tablet (10 mg) by mouth daily 90 tablet 3     spironolactone (ALDACTONE) 25 MG tablet Take 1 tablet (25 mg) by mouth daily 90 tablet 3     Allergies   Allergen Reactions     Azithromycin Rash       Mammogram Screening: Patient over age 50, mutual decision to screen reflected in health maintenance.    Pertinent mammograms are reviewed under the imaging tab.  History of abnormal Pap smear: NO - age 30-65 PAP every 5 years with negative HPV co-testing recommended     Reviewed and updated as needed this visit by clinical staff  Tobacco  Meds  Med Hx  Soc Hx        Reviewed and updated as needed this visit by Provider        Past Medical History:   Diagnosis Date     Acute vaginitis     10/27/2010     Cardiomyopathy (H)     7/18/2014     Diverticulosis of large intestine without perforation or abscess without bleeding     No Comments Provided     Female climacteric  state     10/27/2010     Heart failure (H)     7/25/2014,With systolic dysfunction EF 20-25% by transthroacic echocardiogram 7/2014.     Nonrheumatic aortic valve disorder     last ultrasound was april 2005     Nonrheumatic aortic valve disorder     last ultrasound was april 2014     Nonrheumatic mitral valve insufficiency     7/18/2014,- echo April 2014: moderate MR     Other forms of dyspnea     7/18/2014     Other specified disorders of arteries and arterioles (CODE)     7/17/2014,- echo April 2014: Ascending aorta 4.6 cm     Other specified postprocedural states     7/21/2014,Repair Ascending Aortic Aneurysm with a 30 mm Gelweave Graft     Other specified postprocedural states     7/21/2014,Mitral Valve Ring Annuloplasty with a 32 mm Rigid Saddle Ring     Presence of prosthetic heart valve     7/21/2014,Aortic Valve Replacement with a 25 mm Magna Valve     Pure hypercholesterolemia     9/19/2013     Tachycardia     7/17/2014     Type 2 diabetes mellitus without complications (H)     10/27/2010     Ventricular septal defect     7/18/2014,- apparent hx of VSD that had spontaneous closure at age 17 when pregnant (no documentation)      Past Surgical History:   Procedure Laterality Date     COLONOSCOPY  09/12/2007 09/07,Follow up recommended in five years.     COLONOSCOPY  09/01/2016    normal, 10 year follow up     COLONOSCOPY  10/18/2012     LAPAROSCOPIC TUBAL LIGATION      No Comments Provided     OTHER SURGICAL HISTORY      7/14,24118.0,SD REPLACE AORTIC VALVE OPEN W STENTLESS TISSUE VALVE,Pacemaker placed as well     TONSILLECTOMY, ADENOIDECTOMY, COMBINED       as a child       ROS:  CONSTITUTIONAL: NEGATIVE for fever, chills, change in weight  INTEGUMENTARY/SKIN: NEGATIVE for worrisome rashes, moles or lesions  EYES: NEGATIVE for vision changes or irritation  ENT: NEGATIVE for ear, mouth and throat problems  RESP: NEGATIVE for significant cough or SOB  BREAST: NEGATIVE for masses, tenderness or  "discharge  CV: NEGATIVE for chest pain, palpitations or peripheral edema  GI: NEGATIVE for nausea, abdominal pain, heartburn, or change in bowel habits  : NEGATIVE for unusual urinary or vaginal symptoms. No vaginal bleeding.  MUSCULOSKELETAL: NEGATIVE for significant arthralgias or myalgia  NEURO: NEGATIVE for weakness, dizziness or paresthesias  PSYCHIATRIC: NEGATIVE for changes in mood or affect     OBJECTIVE:   /74   Pulse 66   Temp 97.3  F (36.3  C)   Resp 16   Ht 1.702 m (5' 7\")   Wt 118.8 kg (262 lb)   SpO2 97%   BMI 41.04 kg/m    EXAM:  GENERAL APPEARANCE: healthy, alert and no distress  EYES: Eyes grossly normal to inspection, PERRL and conjunctivae and sclerae normal  HENT: ear canals and TM's normal, nose and mouth without ulcers or lesions, oropharynx clear and oral mucous membranes moist  NECK: no adenopathy, no asymmetry, masses, or scars and thyroid normal to palpation  RESP: lungs clear to auscultation - no rales, rhonchi or wheezes  CV: regular rate and rhythm, normal S1 S2, no S3 or S4, no murmur, click or rub, no peripheral edema and peripheral pulses strong  ABDOMEN: soft, nontender, no hepatosplenomegaly, no masses and bowel sounds normal  MS: no musculoskeletal defects are noted and gait is age appropriate without ataxia  SKIN: no suspicious lesions or rashes  NEURO: Normal strength and tone, sensory exam grossly normal, mentation intact and speech normal  PSYCH: mentation appears normal and affect normal/bright   exam with nurse present shows normal anatomy. No discharge, cervix appears normal.  pap obtained. Bimanual normal, without masses and no cmt.    Diagnostic Test Results:  Labs reviewed in Epic  Results for orders placed or performed in visit on 08/17/20   ALT     Status: None   Result Value Ref Range    ALT 16 7 - 52 U/L   CBC with Platelets       Status: Abnormal   Result Value Ref Range    WBC 5.7 4.0 - 11.0 10e9/L    RBC Count 3.94 3.8 - 5.2 10e12/L    Hemoglobin " 11.2 (L) 11.7 - 15.7 g/dL    Hematocrit 36.1 35.0 - 47.0 %    MCV 92 78 - 100 fl    MCH 28.4 26.5 - 33.0 pg    MCHC 31.0 (L) 31.5 - 36.5 g/dL    RDW 12.5 10.0 - 15.0 %    Platelet Count 186 150 - 450 10e9/L   HEMOGLOBIN A1C     Status: Abnormal   Result Value Ref Range    Hemoglobin A1C 8.4 (H) 4.0 - 6.0 %   BASIC METABOLIC PANEL     Status: Abnormal   Result Value Ref Range    Sodium 139 134 - 144 mmol/L    Potassium 5.0 3.5 - 5.1 mmol/L    Chloride 106 98 - 107 mmol/L    Carbon Dioxide 27 21 - 31 mmol/L    Anion Gap 6 3 - 14 mmol/L    Glucose 152 (H) 70 - 105 mg/dL    Urea Nitrogen 27 (H) 7 - 25 mg/dL    Creatinine 1.98 (H) 0.60 - 1.20 mg/dL    GFR Estimate 25 (L) >60 mL/min/[1.73_m2]    GFR Estimate If Black 31 (L) >60 mL/min/[1.73_m2]    Calcium 9.3 8.6 - 10.3 mg/dL   Albumin Random Urine Quantitative with Creat Ratio     Status: None   Result Value Ref Range    Creatinine Urine 99 mg/dL    Albumin Urine mg/L 7 mg/L    Albumin Urine mg/g Cr 7.07 0 - 25 mg/g Cr   Lipid Profile     Status: None   Result Value Ref Range    Cholesterol 140 <200 mg/dL    Triglycerides 119 <150 mg/dL    HDL Cholesterol 42 23 - 92 mg/dL    LDL Cholesterol Calculated 74 <100 mg/dL    Non HDL Cholesterol 98 <130 mg/dL       ASSESSMENT/PLAN:       ICD-10-CM    1. Routine general medical examination at a health care facility  Z00.00    2. Screening for malignant neoplasm of cervix  Z12.4 Pap Screen Thin Prep with HPV - recommended age 30 - 65 years (select HPV order below)     HPV High Risk Types DNA Cervical     CANCELED: Pap imaged thin layer screen with HPV - recommended age 30 - 65 years (select HPV order below)   3. Type 2 diabetes mellitus without complication, without long-term current use of insulin (H)  E11.9 ALT     CBC with Platelets       HEMOGLOBIN A1C     BASIC METABOLIC PANEL     Albumin Random Urine Quantitative with Creat Ratio     Lipid Profile     bisoprolol (ZEBETA) 5 MG tablet     lisinopril (ZESTRIL) 10 MG tablet      "simvastatin (ZOCOR) 10 MG tablet     B-D ULTRA-FINE 33 LANCETS MISC     blood glucose (NO BRAND SPECIFIED) test strip     metFORMIN (GLUCOPHAGE) 1000 MG tablet     blood glucose monitoring (NO BRAND SPECIFIED) meter device kit     Lipid Profile   4. Essential hypertension  I10 spironolactone (ALDACTONE) 25 MG tablet   5. High creatinine  R79.89 US Renal Complete     Refilled her meds. Not sure why the Cr is high.  Will get an ultrasound and repeat labs in 2 weeks. No NSAIDS.     COUNSELING:   Reviewed preventive health counseling, as reflected in patient instructions       Regular exercise       Healthy diet/nutrition       Vision screening       Colon cancer screening    Estimated body mass index is 41.04 kg/m  as calculated from the following:    Height as of this encounter: 1.702 m (5' 7\").    Weight as of this encounter: 118.8 kg (262 lb).    Weight management plan: Discussed healthy diet and exercise guidelines     reports that she has quit smoking. She has a 10.00 pack-year smoking history. She has never used smokeless tobacco.      Counseling Resources:  ATP IV Guidelines  Pooled Cohorts Equation Calculator  Breast Cancer Risk Calculator  FRAX Risk Assessment  ICSI Preventive Guidelines  Dietary Guidelines for Americans, 2010  USDA's MyPlate  ASA Prophylaxis  Lung CA Screening    Ton Chaudhry MD  [facility] AND [facility]  "

## 2020-08-17 NOTE — NURSING NOTE
"Coming in for a physical check up    Chief Complaint   Patient presents with     Physical     diabetes,        Initial /74   Pulse 66   Temp 97.3  F (36.3  C)   Resp 16   Ht 1.702 m (5' 7\")   Wt 118.8 kg (262 lb)   SpO2 97%   BMI 41.04 kg/m   Estimated body mass index is 41.04 kg/m  as calculated from the following:    Height as of this encounter: 1.702 m (5' 7\").    Weight as of this encounter: 118.8 kg (262 lb).  Medication Reconciliation: complete    Chiquita Reddy LPN  "

## 2020-08-18 PROCEDURE — G0123 SCREEN CERV/VAG THIN LAYER: HCPCS | Performed by: FAMILY MEDICINE

## 2020-08-18 ASSESSMENT — ANXIETY QUESTIONNAIRES: GAD7 TOTAL SCORE: 0

## 2020-08-20 LAB
COPATH REPORT: NORMAL
PAP: NORMAL

## 2020-08-22 LAB
FINAL DIAGNOSIS: NORMAL
HPV HR 12 DNA CVX QL NAA+PROBE: NEGATIVE
HPV16 DNA SPEC QL NAA+PROBE: NEGATIVE
HPV18 DNA SPEC QL NAA+PROBE: NEGATIVE
SPECIMEN DESCRIPTION: NORMAL
SPECIMEN SOURCE CVX/VAG CYTO: NORMAL

## 2020-09-21 ENCOUNTER — HOSPITAL ENCOUNTER (OUTPATIENT)
Dept: MAMMOGRAPHY | Facility: OTHER | Age: 65
End: 2020-09-21
Attending: FAMILY MEDICINE
Payer: COMMERCIAL

## 2020-09-21 ENCOUNTER — HOSPITAL ENCOUNTER (OUTPATIENT)
Dept: ULTRASOUND IMAGING | Facility: OTHER | Age: 65
End: 2020-09-21
Attending: FAMILY MEDICINE
Payer: COMMERCIAL

## 2020-09-21 DIAGNOSIS — Z12.31 VISIT FOR SCREENING MAMMOGRAM: ICD-10-CM

## 2020-09-21 PROCEDURE — 77063 BREAST TOMOSYNTHESIS BI: CPT

## 2020-09-21 PROCEDURE — 76770 US EXAM ABDO BACK WALL COMP: CPT

## 2020-11-02 ENCOUNTER — MYC MEDICAL ADVICE (OUTPATIENT)
Dept: FAMILY MEDICINE | Facility: OTHER | Age: 65
End: 2020-11-02

## 2020-12-27 ENCOUNTER — HEALTH MAINTENANCE LETTER (OUTPATIENT)
Age: 65
End: 2020-12-27

## 2021-03-02 ENCOUNTER — ALLIED HEALTH/NURSE VISIT (OUTPATIENT)
Dept: FAMILY MEDICINE | Facility: OTHER | Age: 66
End: 2021-03-02
Attending: FAMILY MEDICINE
Payer: COMMERCIAL

## 2021-03-02 DIAGNOSIS — Z23 NEED FOR PROPHYLACTIC VACCINATION AND INOCULATION AGAINST INFLUENZA: Primary | ICD-10-CM

## 2021-03-02 DIAGNOSIS — Z23 NEED FOR VACCINATION: ICD-10-CM

## 2021-03-02 PROCEDURE — 90471 IMMUNIZATION ADMIN: CPT

## 2021-03-02 PROCEDURE — 90750 HZV VACC RECOMBINANT IM: CPT

## 2021-03-02 PROCEDURE — 90686 IIV4 VACC NO PRSV 0.5 ML IM: CPT

## 2021-03-02 PROCEDURE — 90472 IMMUNIZATION ADMIN EACH ADD: CPT

## 2021-03-02 NOTE — PROGRESS NOTES
Immunization: Adult  Verified patient's name and . Stated reason for visit today is to receive shingles and flu vaccine(s). Denied any concerns with previous immunizations. Allergies reviewed.  Screening Questionnaire Adult Immunization completed (see below). VIS handout(s) reviewed and given to take home. Shingrix & Influenza prepared and administered per standing order. Administration documented in IMMUNIZATIONS (see MIIC and order for further information).     Screening Questionnaire for Adult Immunization    Are you sick today?   No   Do you have allergies to vaccines or vaccine components?   No   Have you ever had a serious reaction after receiving a vaccination?   No   Have you received any vaccinations in the past 4 weeks?   No     Immunization questionnaire answers were all negative.    Patient encouraged to schedule second dose of Shingrix vaccine prior to leaving the clinic.     Modesta RAMIREZN, RN on 3/2/2021 at 9:36 AM

## 2021-03-06 ENCOUNTER — HEALTH MAINTENANCE LETTER (OUTPATIENT)
Age: 66
End: 2021-03-06

## 2021-03-19 ENCOUNTER — IMMUNIZATION (OUTPATIENT)
Dept: FAMILY MEDICINE | Facility: OTHER | Age: 66
End: 2021-03-19
Attending: FAMILY MEDICINE
Payer: COMMERCIAL

## 2021-03-19 PROCEDURE — 91300 PR COVID VAC PFIZER DIL RECON 30 MCG/0.3 ML IM: CPT

## 2021-03-19 PROCEDURE — 0001A PR COVID VAC PFIZER DIL RECON 30 MCG/0.3 ML IM: CPT

## 2021-04-09 ENCOUNTER — IMMUNIZATION (OUTPATIENT)
Dept: FAMILY MEDICINE | Facility: OTHER | Age: 66
End: 2021-04-09
Attending: FAMILY MEDICINE
Payer: COMMERCIAL

## 2021-04-09 PROCEDURE — 0002A PR COVID VAC PFIZER DIL RECON 30 MCG/0.3 ML IM: CPT

## 2021-04-09 PROCEDURE — 91300 PR COVID VAC PFIZER DIL RECON 30 MCG/0.3 ML IM: CPT

## 2021-06-01 ENCOUNTER — ALLIED HEALTH/NURSE VISIT (OUTPATIENT)
Dept: FAMILY MEDICINE | Facility: OTHER | Age: 66
End: 2021-06-01
Attending: FAMILY MEDICINE
Payer: COMMERCIAL

## 2021-06-01 DIAGNOSIS — Z23 NEED FOR ZOSTER VACCINATION: Primary | ICD-10-CM

## 2021-06-01 PROCEDURE — 90750 HZV VACC RECOMBINANT IM: CPT

## 2021-06-01 PROCEDURE — 90471 IMMUNIZATION ADMIN: CPT

## 2021-06-01 NOTE — PROGRESS NOTES
Immunization Documentation - Shingrix #2  Verified patient's first and last name, and . Stated reason for visit today is to receive the Shingrix vaccine. Denied any concerns with previous immunizations. Allergies reviewed. VIS handout(s) reviewed and given to take home. Shingrix prepared and administered IM per standing order. Administration documented in IMMUNIZATIONS (see flowsheet and order for further information). Patient Instructed to wait in lobby for 15 minutes post-injection and notify staff immediately of any reaction.     Bebe Ku RN ....................  2021   9:00 AM

## 2021-08-04 DIAGNOSIS — E11.9 TYPE 2 DIABETES MELLITUS WITHOUT COMPLICATION, WITHOUT LONG-TERM CURRENT USE OF INSULIN (H): ICD-10-CM

## 2021-08-04 DIAGNOSIS — I10 ESSENTIAL HYPERTENSION: ICD-10-CM

## 2021-08-04 NOTE — LETTER
August 6, 2021      Glenys Ladd  [id number]  [place] [address]        Dear Glenys,         A refill of simvastatin (ZOCOR) 10 MG tablet, lisinopril (ZESTRIL) 10 MG tablet and spironolactone (ALDACTONE) 25 MG tablet have been requested by your pharmacy and we noticed that you are due for an annual exam.  Your last comprehensive visit with Ton Chaudhry MD was on 08/17/2020.    This refill request has been sent to your provider for consideration at this time.    Your health is very important to us.  Please call the clinic at [phone] to schedule your appointment.    Thank you for choosing [facility] and [facility] for your health care needs.    Sincerely,    Refill BRITTNY  [facility]

## 2021-08-06 RX ORDER — LISINOPRIL 10 MG/1
TABLET ORAL
Qty: 90 TABLET | Refills: 0 | Status: SHIPPED | OUTPATIENT
Start: 2021-08-06 | End: 2021-10-14

## 2021-08-06 RX ORDER — SIMVASTATIN 10 MG
TABLET ORAL
Qty: 90 TABLET | Refills: 0 | Status: SHIPPED | OUTPATIENT
Start: 2021-08-06 | End: 2021-10-14

## 2021-08-06 RX ORDER — SPIRONOLACTONE 25 MG/1
TABLET ORAL
Qty: 90 TABLET | Refills: 0 | Status: SHIPPED | OUTPATIENT
Start: 2021-08-06 | End: 2021-10-14

## 2021-08-06 NOTE — TELEPHONE ENCOUNTER
"[facility] sent Rx request for the following:   simvastatin (ZOCOR) 10 MG tablet   Sig TAKE 1 TABLET(10 MG) BY MOUTH DAILY    Last Prescription Date:   08/17/2020  Last Fill Qty/Refills:         90, R-3    Statins Protocol Passed - 8/4/2021  2:57 PM       Passed - LDL on file in past 12 months    Recent Labs   Lab Test 08/17/20  1129   LDL 74            Passed - No abnormal creatine kinase in past 12 months    No lab results found.            Passed - Recent (12 mo) or future (30 days) visit within the authorizing provider's specialty    Patient has had an office visit with the authorizing provider or a provider within the authorizing providers department within the previous 12 mos or has a future within next 30 days. See \"Patient Info\" tab in inbasket, or \"Choose Columns\" in Meds & Orders section of the refill encounter.             Passed - Medication is active on med list       Passed - Patient is age 18 or older       Passed - No active pregnancy on record       Passed - No positive pregnancy test in past 12 months     lisinopril (ZESTRIL) 10 MG tablet  SigTAKE 1 TABLET(10 MG) BY MOUTH DAILY    Last Prescription Date:   08/17/2020  Last Fill Qty/Refills:         90, R-3    ACE Inhibitors (Including Combos) Protocol Failed - 8/4/2021  2:57 PM     spironolactone (ALDACTONE) 25 MG tablet  SigTAKE 1 TABLET(25 MG) BY MOUTH DAILY    Last Prescription Date:   08/17/2020  Last Fill Qty/Refills:         90, R-3    Diuretics (Including Combos) Protocol Failed - 8/4/2021  2:57 PM     Last Office Visit:              08/17/2020 ([facility])   Future Office visit:           None noted      Patient due for annual review. Letter sent via [email or web address]. Prescription approved per [facility] Refill Protocol for 90 day denise refill with notation made to requesting pharmacy. Kellie Ventura RN ....................  8/6/2021   8:52 AM      "

## 2021-08-23 DIAGNOSIS — E11.9 TYPE 2 DIABETES MELLITUS WITHOUT COMPLICATION, WITHOUT LONG-TERM CURRENT USE OF INSULIN (H): ICD-10-CM

## 2021-08-23 NOTE — LETTER
August 24, 2021      Glenys Ladd  [id number]  [place] [address]        Dear Glenys,     This letter is to remind you that you are due for your annual exam with Ton Chaudhry. Your last comprehensive visit was more than 12 months ago.    Please call the clinic at [phone] to schedule your appointment.    If you are no longer seeing Ton Chaudhry for primary care, please call to let us know. Doing so will remove you from our call/contact list.    Thank you for choosing [facility] and [facility] for your health care needs.    Sincerely,    Refill BRITTNY  [facility]

## 2021-08-24 RX ORDER — BISOPROLOL FUMARATE 5 MG/1
TABLET, FILM COATED ORAL
Qty: 180 TABLET | Refills: 0 | Status: SHIPPED | OUTPATIENT
Start: 2021-08-24 | End: 2021-10-14

## 2021-08-24 NOTE — TELEPHONE ENCOUNTER
[facility] sent Rx request for the following:      Requested Prescriptions   Pending Prescriptions Disp Refills     bisoprolol (ZEBETA) 5 MG tablet [Pharmacy Med Name: BISOPROLOL FUMARATE 5MG TABLETS] 180 tablet 3     Sig: TAKE 1 TABLET(5 MG) BY MOUTH TWICE DAILY   Last Prescription Date:   8/17/20  Last Fill Qty/Refills:         180, R-3    Last Office Visit:              8/17/20   Future Office visit:           None  Routing refill request to provider for review/approval because:    Beta-Blockers Protocol Failed - 8/24/2021 10:59 AM        Failed - Blood pressure under 140/90 in past 12 months     Patient is due for annual exam. Reminder letter sent. Unable to complete prescription refill per RN Medication Refill Policy. Haylie Priest RN .............. 8/24/2021  11:01 AM

## 2021-08-31 DIAGNOSIS — E11.9 TYPE 2 DIABETES MELLITUS WITHOUT COMPLICATION, WITHOUT LONG-TERM CURRENT USE OF INSULIN (H): ICD-10-CM

## 2021-09-02 RX ORDER — BISOPROLOL FUMARATE 5 MG/1
TABLET, FILM COATED ORAL
Qty: 180 TABLET | Refills: 0 | OUTPATIENT
Start: 2021-09-02

## 2021-09-02 NOTE — TELEPHONE ENCOUNTER
Redundant refill request refused: Too soon:    Unable to complete prescription refill per RN Medication Refill Policy.................... Kellie Ventura RN ....................  9/2/2021   9:20 AM

## 2021-10-06 ENCOUNTER — IMMUNIZATION (OUTPATIENT)
Dept: FAMILY MEDICINE | Facility: OTHER | Age: 66
End: 2021-10-06
Attending: FAMILY MEDICINE
Payer: MEDICARE

## 2021-10-06 PROCEDURE — 91300 HC COVID VAC PFIZER DIL RECON 30 MCG/0.3 ML IM: CPT

## 2021-10-06 PROCEDURE — 0004A HC ADMIN COVID VAC PFIZER, BOOSTER: CPT

## 2021-10-07 ENCOUNTER — HOSPITAL ENCOUNTER (OUTPATIENT)
Dept: MAMMOGRAPHY | Facility: OTHER | Age: 66
Discharge: HOME | End: 2021-10-07
Attending: FAMILY MEDICINE | Admitting: FAMILY MEDICINE
Payer: MEDICARE

## 2021-10-07 DIAGNOSIS — Z12.31 VISIT FOR SCREENING MAMMOGRAM: ICD-10-CM

## 2021-10-07 PROCEDURE — 77063 BREAST TOMOSYNTHESIS BI: CPT

## 2021-10-09 ENCOUNTER — HEALTH MAINTENANCE LETTER (OUTPATIENT)
Age: 66
End: 2021-10-09

## 2021-10-14 ENCOUNTER — OFFICE VISIT (OUTPATIENT)
Dept: FAMILY MEDICINE | Facility: OTHER | Age: 66
End: 2021-10-14
Attending: FAMILY MEDICINE
Payer: MEDICARE

## 2021-10-14 VITALS
BODY MASS INDEX: 40.56 KG/M2 | HEART RATE: 64 BPM | TEMPERATURE: 97.9 F | RESPIRATION RATE: 18 BRPM | SYSTOLIC BLOOD PRESSURE: 130 MMHG | HEIGHT: 67 IN | DIASTOLIC BLOOD PRESSURE: 76 MMHG | WEIGHT: 258.4 LBS

## 2021-10-14 DIAGNOSIS — Z00.00 ENCOUNTER FOR MEDICARE ANNUAL WELLNESS EXAM: Primary | ICD-10-CM

## 2021-10-14 DIAGNOSIS — N18.4 CKD (CHRONIC KIDNEY DISEASE) STAGE 4, GFR 15-29 ML/MIN (H): ICD-10-CM

## 2021-10-14 DIAGNOSIS — Z23 INFLUENZA VACCINE NEEDED: ICD-10-CM

## 2021-10-14 DIAGNOSIS — Z78.0 ASYMPTOMATIC POSTMENOPAUSAL STATE: ICD-10-CM

## 2021-10-14 DIAGNOSIS — I10 ESSENTIAL HYPERTENSION: ICD-10-CM

## 2021-10-14 DIAGNOSIS — E11.9 TYPE 2 DIABETES MELLITUS WITHOUT COMPLICATION, WITHOUT LONG-TERM CURRENT USE OF INSULIN (H): ICD-10-CM

## 2021-10-14 DIAGNOSIS — I50.9 ACUTE ON CHRONIC CONGESTIVE HEART FAILURE, UNSPECIFIED HEART FAILURE TYPE (H): ICD-10-CM

## 2021-10-14 LAB
ALT SERPL W P-5'-P-CCNC: 15 U/L (ref 7–52)
ANION GAP SERPL CALCULATED.3IONS-SCNC: 6 MMOL/L (ref 3–14)
BUN SERPL-MCNC: 25 MG/DL (ref 7–25)
CALCIUM SERPL-MCNC: 9.7 MG/DL (ref 8.6–10.3)
CHLORIDE BLD-SCNC: 106 MMOL/L (ref 98–107)
CHOLEST SERPL-MCNC: 171 MG/DL
CO2 SERPL-SCNC: 27 MMOL/L (ref 21–31)
CREAT SERPL-MCNC: 1.88 MG/DL (ref 0.6–1.2)
ERYTHROCYTE [DISTWIDTH] IN BLOOD BY AUTOMATED COUNT: 13.2 % (ref 10–15)
FASTING STATUS PATIENT QL REPORTED: YES
GFR SERPL CREATININE-BSD FRML MDRD: 28 ML/MIN/1.73M2
GLUCOSE BLD-MCNC: 192 MG/DL (ref 70–105)
HBA1C MFR BLD: 7.8 % (ref 4–6.2)
HCT VFR BLD AUTO: 38.2 % (ref 35–47)
HDLC SERPL-MCNC: 47 MG/DL (ref 23–92)
HGB BLD-MCNC: 11.8 G/DL (ref 11.7–15.7)
LDLC SERPL CALC-MCNC: 95 MG/DL
MCH RBC QN AUTO: 28 PG (ref 26.5–33)
MCHC RBC AUTO-ENTMCNC: 30.9 G/DL (ref 31.5–36.5)
MCV RBC AUTO: 91 FL (ref 78–100)
NONHDLC SERPL-MCNC: 124 MG/DL
PLATELET # BLD AUTO: 164 10E3/UL (ref 150–450)
POTASSIUM BLD-SCNC: 5 MMOL/L (ref 3.5–5.1)
RBC # BLD AUTO: 4.21 10E6/UL (ref 3.8–5.2)
SODIUM SERPL-SCNC: 139 MMOL/L (ref 134–144)
TRIGL SERPL-MCNC: 144 MG/DL
WBC # BLD AUTO: 5.3 10E3/UL (ref 4–11)

## 2021-10-14 PROCEDURE — 80048 BASIC METABOLIC PNL TOTAL CA: CPT | Mod: ZL | Performed by: FAMILY MEDICINE

## 2021-10-14 PROCEDURE — 84460 ALANINE AMINO (ALT) (SGPT): CPT | Mod: ZL | Performed by: FAMILY MEDICINE

## 2021-10-14 PROCEDURE — G0402 INITIAL PREVENTIVE EXAM: HCPCS | Mod: 25 | Performed by: FAMILY MEDICINE

## 2021-10-14 PROCEDURE — 85027 COMPLETE CBC AUTOMATED: CPT | Mod: ZL | Performed by: FAMILY MEDICINE

## 2021-10-14 PROCEDURE — 83036 HEMOGLOBIN GLYCOSYLATED A1C: CPT | Mod: ZL | Performed by: FAMILY MEDICINE

## 2021-10-14 PROCEDURE — G0402 INITIAL PREVENTIVE EXAM: HCPCS | Performed by: FAMILY MEDICINE

## 2021-10-14 PROCEDURE — G0463 HOSPITAL OUTPT CLINIC VISIT: HCPCS

## 2021-10-14 PROCEDURE — G0463 HOSPITAL OUTPT CLINIC VISIT: HCPCS | Mod: 25

## 2021-10-14 PROCEDURE — 36415 COLL VENOUS BLD VENIPUNCTURE: CPT | Mod: ZL | Performed by: FAMILY MEDICINE

## 2021-10-14 PROCEDURE — 80061 LIPID PANEL: CPT | Mod: ZL | Performed by: FAMILY MEDICINE

## 2021-10-14 PROCEDURE — 99214 OFFICE O/P EST MOD 30 MIN: CPT | Mod: 25 | Performed by: FAMILY MEDICINE

## 2021-10-14 PROCEDURE — G0008 ADMIN INFLUENZA VIRUS VAC: HCPCS

## 2021-10-14 PROCEDURE — 90662 IIV NO PRSV INCREASED AG IM: CPT

## 2021-10-14 RX ORDER — BLOOD-GLUCOSE METER
1 KIT MISCELLANEOUS 2 TIMES DAILY
Qty: 200 EACH | Refills: 6 | Status: SHIPPED | OUTPATIENT
Start: 2021-10-14

## 2021-10-14 RX ORDER — SIMVASTATIN 10 MG
10 TABLET ORAL DAILY
Qty: 90 TABLET | Refills: 3 | Status: SHIPPED | OUTPATIENT
Start: 2021-10-14 | End: 2022-11-28

## 2021-10-14 RX ORDER — SPIRONOLACTONE 25 MG/1
25 TABLET ORAL DAILY
Qty: 90 TABLET | Refills: 3 | Status: SHIPPED | OUTPATIENT
Start: 2021-10-14 | End: 2022-11-28

## 2021-10-14 RX ORDER — LISINOPRIL 10 MG/1
10 TABLET ORAL DAILY
Qty: 90 TABLET | Refills: 3 | Status: SHIPPED | OUTPATIENT
Start: 2021-10-14 | End: 2023-02-08

## 2021-10-14 RX ORDER — BISOPROLOL FUMARATE 5 MG/1
TABLET, FILM COATED ORAL
Qty: 180 TABLET | Refills: 3 | Status: SHIPPED | OUTPATIENT
Start: 2021-10-14 | End: 2023-01-23

## 2021-10-14 ASSESSMENT — MIFFLIN-ST. JEOR: SCORE: 1749.72

## 2021-10-14 ASSESSMENT — PAIN SCALES - GENERAL: PAINLEVEL: NO PAIN (0)

## 2021-10-14 NOTE — NURSING NOTE
Patient presents today for welcome to medicare visit.    Medication Reconciliation Complete    Daxa Edward LPN  10/14/2021 8:44 AM

## 2021-10-14 NOTE — PATIENT INSTRUCTIONS
Patient Education   Personalized Prevention Plan  You are due for the preventive services outlined below.  Your care team is available to assist you in scheduling these services.  If you have already completed any of these items, please share that information with your care team to update in your medical record.  Health Maintenance Due   Topic Date Due     Osteoporosis Screening  Never done     Heart Failure Action Plan  Never done     Eye Exam  Never done     HIV Screening  Never done     Diabetic Foot Exam  05/10/2020     A1C Lab  02/17/2021     Basic Metabolic Panel  02/17/2021     Liver Monitoring Lab  08/17/2021     Cholesterol Lab  08/17/2021     Kidney Microalbumin Urine Test  08/17/2021     Complete Blood Count  08/17/2021     Flu Vaccine (1) 09/01/2021     FALL RISK ASSESSMENT  09/15/2021

## 2021-10-14 NOTE — PROGRESS NOTES
"  SUBJECTIVE:   Glenys Ladd is a 66 year old female who presents for Preventive Visit.      Patient has been advised of split billing requirements and indicates understanding: Yes  Are you in the first 12 months of your Medicare Part B coverage?  Yes,  Visual Acuity:  Right Eye:     Left Eye:    Both Eyes:  Patient did not have her glasses with her.    Physical Health:    In general, how would you rate your overall physical health? good    Outside of work, how many days during the week do you exercise? 2-3 days/week    Outside of work, approximately how many minutes a day do you exercise?30-45 minutes    If you drink alcohol do you typically have >3 drinks per day or >7 drinks per week? No    Do you usually eat at least 4 servings of fruit and vegetables a day, include whole grains & fiber and avoid regularly eating high fat or \"junk\" foods? NO    Do you have any problems taking medications regularly?  No    Do you have any side effects from medications? none    Needs assistance for the following daily activities: no assistance needed    Which of the following safety concerns are present in your home?  none identified     Hearing impairment: No    In the past 6 months, have you been bothered by leaking of urine? no    Mental Health:    In general, how would you rate your overall mental or emotional health? excellent  PHQ-2 Score: 0    Do you feel safe in your environment? Yes    Have you ever done Advance Care Planning? (For example, a Health Directive, POLST, or a discussion with a medical provider or your loved ones about your wishes): No, advance care planning information given to patient to review.  Patient declined advance care planning discussion at this time.    Additional concerns to address?  No    Fall risk:  Fallen 2 or more times in the past year?: No  Any fall with injury in the past year?: No    Cognitive Screenin) Repeat 3 items (Leader, Season, Table)    2) Clock draw: NORMAL  3) 3 item " recall: Recalls 2 objects   Results: NORMAL clock, 1-2 items recalled: COGNITIVE IMPAIRMENT LESS LIKELY    Mini-CogTM Copyright S Lorin. Licensed by the author for use in [facility]; reprinted with permission (barbara@[facility]). All rights reserved.      Do you have sleep apnea, excessive snoring or daytime drowsiness?: no            Reviewed and updated as needed this visit by clinical staff  Tobacco  Allergies  Meds      Soc Hx       Current Outpatient Medications   Medication     aspirin 81 MG chewable tablet     B-D ULTRA-FINE 33 LANCETS MISC     bisoprolol (ZEBETA) 5 MG tablet     blood glucose (NO BRAND SPECIFIED) test strip     blood glucose monitoring (NO BRAND SPECIFIED) meter device kit     Blood Pressure Monitoring (RA BLOOD PRESSURE CUFF MONITOR) MISC     lisinopril (ZESTRIL) 10 MG tablet     metFORMIN (GLUCOPHAGE) 1000 MG tablet     simvastatin (ZOCOR) 10 MG tablet     spironolactone (ALDACTONE) 25 MG tablet     No current facility-administered medications for this visit.       Reviewed and updated as needed this visit by Provider                Social History     Tobacco Use     Smoking status: Former Smoker     Packs/day: 0.50     Years: 20.00     Pack years: 10.00     Smokeless tobacco: Never Used   Substance Use Topics     Alcohol use: No     Alcohol/week: 0.0 standard drinks                           Current providers sharing in care for this patient include:    Patient Care Team:  Ton Chaudhry MD as PCP - General (Family Practice)  Ton Chaudhry MD as Assigned PCP    The following health maintenance items are reviewed in Epic and correct as of today:  Health Maintenance   Topic Date Due     DEXA  Never done     HF ACTION PLAN  Never done     EYE EXAM  Never done     HIV SCREENING  Never done     DIABETIC FOOT EXAM  05/10/2020     A1C  02/17/2021     BMP  02/17/2021     ALT  08/17/2021     LIPID  08/17/2021     MICROALBUMIN  08/17/2021     CBC  08/17/2021     INFLUENZA VACCINE (1)  "09/01/2021     FALL RISK ASSESSMENT  09/15/2021     MEDICARE ANNUAL WELLNESS VISIT  10/14/2022     MAMMO SCREENING  10/07/2023     ADVANCE CARE PLANNING  05/10/2024     Pneumococcal Vaccine: Pediatrics (0 to 5 Years) and At-Risk Patients (6 to 64 Years) (2 of 2 - PPSV23) 05/10/2024     Pneumococcal Vaccine: 65+ Years (2 of 2 - PPSV23) 05/10/2024     COLORECTAL CANCER SCREENING  09/01/2026     DTAP/TDAP/TD IMMUNIZATION (3 - Td or Tdap) 08/17/2030     TSH W/FREE T4 REFLEX  Completed     HEPATITIS C SCREENING  Completed     PHQ-2  Completed     ZOSTER IMMUNIZATION  Completed     COVID-19 Vaccine  Completed     IPV IMMUNIZATION  Aged Out     MENINGITIS IMMUNIZATION  Aged Out     Lab work is in process      ROS:  Constitutional, HEENT, cardiovascular, pulmonary, gi and gu systems are negative, except as otherwise noted.    OBJECTIVE:   There were no vitals taken for this visit. Estimated body mass index is 41.04 kg/m  as calculated from the following:    Height as of 8/17/20: 1.702 m (5' 7\").    Weight as of 8/17/20: 118.8 kg (262 lb).  EXAM:   GENERAL APPEARANCE: healthy, alert and no distress  EYES: Eyes grossly normal to inspection, PERRL and conjunctivae and sclerae normal  HENT: ear canals and TM's normal, nose and mouth without ulcers or lesions, oropharynx clear and oral mucous membranes moist  NECK: no adenopathy, no asymmetry, masses, or scars and thyroid normal to palpation  RESP: lungs clear to auscultation - no rales, rhonchi or wheezes  BREAST: normal without masses, tenderness or nipple discharge and no palpable axillary masses or adenopathy  CV: Regular rate and rhythm, historical murmur, no peripheral edema  ABDOMEN: soft, nontender, no hepatosplenomegaly, no masses and bowel sounds normal  MS: no musculoskeletal defects are noted and gait is age appropriate without ataxia  SKIN: no suspicious lesions or rashes  NEURO: Normal strength and tone, sensory exam grossly normal, mentation intact and speech " normal  PSYCH: mentation appears normal and affect normal/bright    Diagnostic Test Results:  Labs reviewed in Epic  No results found for this or any previous visit (from the past 24 hour(s)).    Results for orders placed or performed in visit on 10/14/21   Lipid Panel     Status: None   Result Value Ref Range    Cholesterol 171 <200 mg/dL    Triglycerides 144 <150 mg/dL    Direct Measure HDL 47 23 - 92 mg/dL    LDL Cholesterol Calculated 95 <=100 mg/dL    Non HDL Cholesterol 124 <130 mg/dL    Patient Fasting > 8hrs? Yes     Narrative    Cholesterol  Desirable:  <200 mg/dL    Triglycerides  Normal:  Less than 150 mg/dL  Borderline High:  150-199 mg/dL  High:  200-499 mg/dL  Very High:  Greater than or equal to 500 mg/dL    Direct Measure HDL  Female:  Greater than or equal to 50 mg/dL   Male:  Greater than or equal to 40 mg/dL    LDL Cholesterol  Desirable:  <100mg/dL  Above Desirable:  100-129 mg/dL   Borderline High:  130-159 mg/dL   High:  160-189 mg/dL   Very High:  >= 190 mg/dL    Non HDL Cholesterol  Desirable:  130 mg/dL  Above Desirable:  130-159 mg/dL  Borderline High:  160-189 mg/dL  High:  190-219 mg/dL  Very High:  Greater than or equal to 220 mg/dL   Hemoglobin A1c     Status: Abnormal   Result Value Ref Range    Hemoglobin A1C 7.8 (H) 4.0 - 6.2 %   Basic Metabolic Panel     Status: Abnormal   Result Value Ref Range    Sodium 139 134 - 144 mmol/L    Potassium 5.0 3.5 - 5.1 mmol/L    Chloride 106 98 - 107 mmol/L    Carbon Dioxide (CO2) 27 21 - 31 mmol/L    Anion Gap 6 3 - 14 mmol/L    Urea Nitrogen 25 7 - 25 mg/dL    Creatinine 1.88 (H) 0.60 - 1.20 mg/dL    Calcium 9.7 8.6 - 10.3 mg/dL    Glucose 192 (H) 70 - 105 mg/dL    GFR Estimate 28 (L) >60 mL/min/1.73m2   ALT (SGPT)     Status: Normal   Result Value Ref Range    ALT 15 7 - 52 U/L   CBC W PLT No Diff     Status: Abnormal   Result Value Ref Range    WBC Count 5.3 4.0 - 11.0 10e3/uL    RBC Count 4.21 3.80 - 5.20 10e6/uL    Hemoglobin 11.8 11.7 - 15.7  g/dL    Hematocrit 38.2 35.0 - 47.0 %    MCV 91 78 - 100 fL    MCH 28.0 26.5 - 33.0 pg    MCHC 30.9 (L) 31.5 - 36.5 g/dL    RDW 13.2 10.0 - 15.0 %    Platelet Count 164 150 - 450 10e3/uL       ASSESSMENT / PLAN:     Problem List Items Addressed This Visit        Endocrine    Diabetes mellitus, type II (H)    Relevant Medications    lisinopril (ZESTRIL) 10 MG tablet    bisoprolol (ZEBETA) 5 MG tablet    metFORMIN (GLUCOPHAGE) 1000 MG tablet    B-D ULTRA-FINE 33 LANCETS MISC    blood glucose (NO BRAND SPECIFIED) test strip    simvastatin (ZOCOR) 10 MG tablet    Other Relevant Orders    Hemoglobin A1c    Basic Metabolic Panel    ALT (SGPT)    Lipid Panel    CBC W PLT No Diff       Circulatory    CHF, acute on chronic (H)    Essential hypertension    Relevant Medications    lisinopril (ZESTRIL) 10 MG tablet    spironolactone (ALDACTONE) 25 MG tablet       Urinary    CKD (chronic kidney disease) stage 4, GFR 15-29 ml/min (H)    Relevant Orders    Adult Nephrology Referral      Other Visit Diagnoses     Encounter for Medicare annual wellness exam    -  Primary    Asymptomatic postmenopausal state        Relevant Orders    DX Hip/Pelvis/Spine    Influenza vaccine needed        Relevant Orders    INFLUENZA, QUAD, HIGH DOSE, PF, 65YR + (FLUZONE HD) (Completed)    Body mass index (BMI) 40.0-44.9, adult (H)        Relevant Medications    metFORMIN (GLUCOPHAGE) 1000 MG tablet      Based off current labs, patient appears to be in CKD4. Unclear why kidney function is declining, referral to nephrologist placed. Patient has no clinical symptoms. Informed pt about possibility of dialysis if renal function continues to decline.    Possible that diabetes control is declining, metformin may be damaging her renal function. Lab recheck today, f/u in 3 months. Advised switch from metformin to alternative hyperglycemic control, likely a GLP-1 agonist. Discussed benefits of better glycemic control with added benefit of weight loss. Patient  "would like to see if she can afford this first. Told to pt to update me if she would like to pursue alternate treatment pathways.     Patient has been advised of split billing requirements and indicates understanding: Yes    COUNSELING:  Reviewed preventive health counseling, as reflected in patient instructions       Regular exercise       Healthy diet/nutrition       CKD management    Estimated body mass index is 41.04 kg/m  as calculated from the following:    Height as of 8/17/20: 1.702 m (5' 7\").    Weight as of 8/17/20: 118.8 kg (262 lb).    Weight management plan: Discussed healthy diet and exercise guidelines    She reports that she has quit smoking. She has a 10.00 pack-year smoking history. She has never used smokeless tobacco.    Appropriate preventive services were discussed with this patient, including applicable screening as appropriate for cardiovascular disease, diabetes, osteopenia/osteoporosis, and glaucoma.  As appropriate for age/gender, discussed screening for colorectal cancer, prostate cancer, breast cancer, and cervical cancer. Checklist reviewing preventive services available has been given to the patient.    Reviewed patients plan of care and provided an AVS. The Intermediate Care Plan ( asthma action plan, low back pain action plan, and migraine action plan) for Glenys meets the Care Plan requirement. This Care Plan has been established and reviewed with the Patient.    Counseling Resources:  ATP IV Guidelines  Pooled Cohorts Equation Calculator  Breast Cancer Risk Calculator  BRCA-Related Cancer Risk Assessment: FHS-7 Tool  FRAX Risk Assessment  ICSI Preventive Guidelines  Dietary Guidelines for Americans, 2010  USDA's MyPlate  ASA Prophylaxis  Lung CA Screening    Brennon López MS3  [employer]    Ton Chaudhry MD  [facility]    "

## 2021-10-15 ENCOUNTER — HOSPITAL ENCOUNTER (OUTPATIENT)
Dept: BONE DENSITY | Facility: OTHER | Age: 66
Discharge: HOME | End: 2021-10-15
Attending: FAMILY MEDICINE | Admitting: FAMILY MEDICINE
Payer: MEDICARE

## 2021-10-15 DIAGNOSIS — Z78.0 ASYMPTOMATIC POSTMENOPAUSAL STATE: ICD-10-CM

## 2021-10-15 PROCEDURE — 77080 DXA BONE DENSITY AXIAL: CPT

## 2021-10-25 ENCOUNTER — MYC MEDICAL ADVICE (OUTPATIENT)
Dept: FAMILY MEDICINE | Facility: OTHER | Age: 66
End: 2021-10-25

## 2021-10-25 DIAGNOSIS — E11.9 TYPE 2 DIABETES MELLITUS WITHOUT COMPLICATION, WITHOUT LONG-TERM CURRENT USE OF INSULIN (H): Primary | ICD-10-CM

## 2021-10-26 RX ORDER — GLYBURIDE 5 MG/1
5 TABLET ORAL
Qty: 90 TABLET | Refills: 4 | Status: SHIPPED | OUTPATIENT
Start: 2021-10-26 | End: 2023-02-06

## 2022-05-21 ENCOUNTER — HEALTH MAINTENANCE LETTER (OUTPATIENT)
Age: 67
End: 2022-05-21

## 2022-08-25 ENCOUNTER — LAB (OUTPATIENT)
Dept: LAB | Facility: OTHER | Age: 67
End: 2022-08-25
Attending: INTERNAL MEDICINE
Payer: MEDICARE

## 2022-08-25 DIAGNOSIS — I48.0 PAROXYSMAL ATRIAL FIBRILLATION (H): ICD-10-CM

## 2022-08-25 LAB
CREAT SERPL-MCNC: 2.15 MG/DL (ref 0.6–1.2)
GFR SERPL CREATININE-BSD FRML MDRD: 25 ML/MIN/1.73M2

## 2022-08-25 PROCEDURE — 36415 COLL VENOUS BLD VENIPUNCTURE: CPT | Mod: ZL

## 2022-08-25 PROCEDURE — 82565 ASSAY OF CREATININE: CPT | Mod: ZL

## 2022-09-17 ENCOUNTER — HEALTH MAINTENANCE LETTER (OUTPATIENT)
Age: 67
End: 2022-09-17

## 2022-11-25 DIAGNOSIS — I10 ESSENTIAL HYPERTENSION: ICD-10-CM

## 2022-11-25 DIAGNOSIS — E11.9 TYPE 2 DIABETES MELLITUS WITHOUT COMPLICATION, WITHOUT LONG-TERM CURRENT USE OF INSULIN (H): ICD-10-CM

## 2022-11-28 RX ORDER — SIMVASTATIN 10 MG
TABLET ORAL
Qty: 90 TABLET | Refills: 0 | Status: SHIPPED | OUTPATIENT
Start: 2022-11-28 | End: 2023-02-08

## 2022-11-28 RX ORDER — SPIRONOLACTONE 25 MG/1
TABLET ORAL
Qty: 90 TABLET | Refills: 0 | Status: SHIPPED | OUTPATIENT
Start: 2022-11-28 | End: 2023-02-08

## 2022-11-28 NOTE — TELEPHONE ENCOUNTER
[id number] sent Rx request for the following:      Requested Prescriptions   Pending Prescriptions Disp Refills     spironolactone (ALDACTONE) 25 MG tablet [Pharmacy Med Name: Spironolactone 25 MG Oral Tablet] 90 tablet 0     Sig: Take 1 tablet by mouth once daily   Last Prescription Date:   10/14/21  Last Fill Qty/Refills:         90, R-3      Diuretics (Including Combos) Protocol Failed - 11/28/2022 10:55 AM        Failed - Blood pressure under 140/90 in past 12 months     BP Readings from Last 3 Encounters:   10/14/21 130/76   08/17/20 138/74   05/10/19 138/72           Failed - Recent (12 mo) or future (30 days) visit within the authorizing provider's specialty        Failed - Normal serum creatinine on file in past 12 months     Recent Labs   Lab Test 08/25/22  1546   CR 2.15*           Failed - Normal serum potassium on file in past 12 months     Recent Labs   Lab Test 10/14/21  0948   POTASSIUM 5.0           Failed - Normal serum sodium on file in past 12 months     Recent Labs   Lab Test 10/14/21  0948              simvastatin (ZOCOR) 10 MG tablet [Pharmacy Med Name: Simvastatin 10 MG Oral Tablet] 90 tablet 0     Sig: Take 1 tablet by mouth once daily   Last Prescription Date:   10/14/21  Last Fill Qty/Refills:         90, R-3      Statins Protocol Failed - 11/28/2022 10:55 AM        Failed - LDL on file in past 12 months     Recent Labs   Lab Test 10/14/21  0948   LDL 95           Failed - Recent (12 mo) or future (30 days) visit within the authorizing provider's specialty     Last Office Visit:              10/14/21   Future Office visit:           None    Pt due for annual exam. Routing to provider for refill consideration. Routing to  OUTREACH APPT REQUESTS pool, to assist Pt in scheduling appointment.     Haylie Priest RN .............. 11/28/2022  11:00 AM

## 2023-01-17 DIAGNOSIS — E11.9 TYPE 2 DIABETES MELLITUS WITHOUT COMPLICATION, WITHOUT LONG-TERM CURRENT USE OF INSULIN (H): ICD-10-CM

## 2023-01-23 ENCOUNTER — HEALTH MAINTENANCE LETTER (OUTPATIENT)
Age: 68
End: 2023-01-23

## 2023-01-23 RX ORDER — BISOPROLOL FUMARATE 5 MG/1
TABLET, FILM COATED ORAL
Qty: 180 TABLET | Refills: 0 | Status: SHIPPED | OUTPATIENT
Start: 2023-01-23 | End: 2023-05-02

## 2023-01-23 NOTE — TELEPHONE ENCOUNTER
[id number] [place] sent Rx request for the following:      Requested Prescriptions   Pending Prescriptions Disp Refills     bisoprolol (ZEBETA) 5 MG tablet [Pharmacy Med Name: Bisoprolol Fumarate 5 MG Oral Tablet] 180 tablet 0     Sig: Take 1 tablet by mouth twice daily       Beta-Blockers Protocol Failed - 1/23/2023 10:54 AM        Failed - Blood pressure under 140/90 in past 12 months     BP Readings from Last 3 Encounters:   10/14/21 130/76   08/17/20 138/74   05/10/19 138/72        Last Prescription Date:   10/14/21  Last Fill Qty/Refills:         180, R-3    Last Office Visit:              10/14/21   Future Office visit:             Next 5 appointments (look out 90 days)    Feb 08, 2023  9:00 AM  [facility]  [facility] ([facility] ) [address]  [place] [address]  [phone]        Per LOV note: Return in about 53 weeks (around 10/20/2022) for Annual Wellness Visit.    Haylie Priest RN .............. 1/23/2023  11:06 AM

## 2023-01-31 DIAGNOSIS — E11.9 TYPE 2 DIABETES MELLITUS WITHOUT COMPLICATION, WITHOUT LONG-TERM CURRENT USE OF INSULIN (H): ICD-10-CM

## 2023-02-06 RX ORDER — GLYBURIDE 5 MG/1
TABLET ORAL
Qty: 90 TABLET | Refills: 0 | Status: SHIPPED | OUTPATIENT
Start: 2023-02-06 | End: 2023-02-08

## 2023-02-06 NOTE — TELEPHONE ENCOUNTER
glyBURIDE 5 MG Oral Tablet        Last Written Prescription Date:  10/26/21  Last Fill Quantity: 90,   # refills: 4  Last Office Visit: 10/14/21  Future Office visit:    Next 5 appointments (look out 90 days)    Feb 08, 2023  9:00 AM  MyChart Physical Adult with Ton Chaudhry MD  [facility] ([facility] ) [address]  [place] [facility] [place] [address]  [phone]           Routing refill request to provider for review/approval because:  Drug not on the [facility], P or [facility] refill protocol or controlled substance.  Unable to complete prescription refill per RNMedication Refill Policy.................... Glenys Llanos RN ....................  2/6/2023   8:39 AM

## 2023-02-07 ASSESSMENT — ENCOUNTER SYMPTOMS
CHILLS: 0
DIARRHEA: 0
HEARTBURN: 0
JOINT SWELLING: 0
ABDOMINAL PAIN: 0
DIZZINESS: 0
COUGH: 0
PARESTHESIAS: 0
NAUSEA: 0
CONSTIPATION: 0
HEADACHES: 0
HEMATURIA: 0
BREAST MASS: 0
EYE PAIN: 0
ARTHRALGIAS: 1
WEAKNESS: 0
MYALGIAS: 0
SHORTNESS OF BREATH: 0
SORE THROAT: 0
HEMATOCHEZIA: 0
NERVOUS/ANXIOUS: 0
PALPITATIONS: 0
FREQUENCY: 0
FEVER: 0
DYSURIA: 0

## 2023-02-07 ASSESSMENT — ANXIETY QUESTIONNAIRES
GAD7 TOTAL SCORE: 0
6. BECOMING EASILY ANNOYED OR IRRITABLE: NOT AT ALL
1. FEELING NERVOUS, ANXIOUS, OR ON EDGE: NOT AT ALL
5. BEING SO RESTLESS THAT IT IS HARD TO SIT STILL: NOT AT ALL
8. IF YOU CHECKED OFF ANY PROBLEMS, HOW DIFFICULT HAVE THESE MADE IT FOR YOU TO DO YOUR WORK, TAKE CARE OF THINGS AT HOME, OR GET ALONG WITH OTHER PEOPLE?: NOT DIFFICULT AT ALL
7. FEELING AFRAID AS IF SOMETHING AWFUL MIGHT HAPPEN: NOT AT ALL
2. NOT BEING ABLE TO STOP OR CONTROL WORRYING: NOT AT ALL
GAD7 TOTAL SCORE: 0
3. WORRYING TOO MUCH ABOUT DIFFERENT THINGS: NOT AT ALL
IF YOU CHECKED OFF ANY PROBLEMS ON THIS QUESTIONNAIRE, HOW DIFFICULT HAVE THESE PROBLEMS MADE IT FOR YOU TO DO YOUR WORK, TAKE CARE OF THINGS AT HOME, OR GET ALONG WITH OTHER PEOPLE: NOT DIFFICULT AT ALL
7. FEELING AFRAID AS IF SOMETHING AWFUL MIGHT HAPPEN: NOT AT ALL
4. TROUBLE RELAXING: NOT AT ALL
GAD7 TOTAL SCORE: 0

## 2023-02-07 ASSESSMENT — PATIENT HEALTH QUESTIONNAIRE - PHQ9
SUM OF ALL RESPONSES TO PHQ QUESTIONS 1-9: 0
10. IF YOU CHECKED OFF ANY PROBLEMS, HOW DIFFICULT HAVE THESE PROBLEMS MADE IT FOR YOU TO DO YOUR WORK, TAKE CARE OF THINGS AT HOME, OR GET ALONG WITH OTHER PEOPLE: NOT DIFFICULT AT ALL
SUM OF ALL RESPONSES TO PHQ QUESTIONS 1-9: 0

## 2023-02-07 ASSESSMENT — ACTIVITIES OF DAILY LIVING (ADL): CURRENT_FUNCTION: NO ASSISTANCE NEEDED

## 2023-02-08 ENCOUNTER — OFFICE VISIT (OUTPATIENT)
Dept: FAMILY MEDICINE | Facility: OTHER | Age: 68
End: 2023-02-08
Attending: FAMILY MEDICINE
Payer: MEDICARE

## 2023-02-08 VITALS
BODY MASS INDEX: 41.63 KG/M2 | RESPIRATION RATE: 16 BRPM | DIASTOLIC BLOOD PRESSURE: 80 MMHG | TEMPERATURE: 97.5 F | OXYGEN SATURATION: 99 % | WEIGHT: 265.8 LBS | HEART RATE: 65 BPM | SYSTOLIC BLOOD PRESSURE: 132 MMHG

## 2023-02-08 DIAGNOSIS — E11.22 TYPE 2 DIABETES MELLITUS WITH STAGE 3B CHRONIC KIDNEY DISEASE, WITHOUT LONG-TERM CURRENT USE OF INSULIN (H): ICD-10-CM

## 2023-02-08 DIAGNOSIS — N18.32 TYPE 2 DIABETES MELLITUS WITH STAGE 3B CHRONIC KIDNEY DISEASE, WITHOUT LONG-TERM CURRENT USE OF INSULIN (H): ICD-10-CM

## 2023-02-08 DIAGNOSIS — E78.00 ELEVATED CHOLESTEROL: ICD-10-CM

## 2023-02-08 DIAGNOSIS — Z00.00 ENCOUNTER FOR MEDICARE ANNUAL WELLNESS EXAM: Primary | ICD-10-CM

## 2023-02-08 DIAGNOSIS — N18.4 CKD (CHRONIC KIDNEY DISEASE) STAGE 4, GFR 15-29 ML/MIN (H): ICD-10-CM

## 2023-02-08 DIAGNOSIS — I50.9 ACUTE ON CHRONIC CONGESTIVE HEART FAILURE, UNSPECIFIED HEART FAILURE TYPE (H): ICD-10-CM

## 2023-02-08 DIAGNOSIS — Z13.220 SCREENING FOR HYPERLIPIDEMIA: ICD-10-CM

## 2023-02-08 DIAGNOSIS — I48.0 PAROXYSMAL ATRIAL FIBRILLATION (H): ICD-10-CM

## 2023-02-08 DIAGNOSIS — E11.9 TYPE 2 DIABETES MELLITUS WITHOUT COMPLICATION, WITHOUT LONG-TERM CURRENT USE OF INSULIN (H): ICD-10-CM

## 2023-02-08 DIAGNOSIS — I10 ESSENTIAL HYPERTENSION: ICD-10-CM

## 2023-02-08 LAB
ALT SERPL W P-5'-P-CCNC: 17 U/L (ref 10–35)
ANION GAP SERPL CALCULATED.3IONS-SCNC: 9 MMOL/L (ref 7–15)
BUN SERPL-MCNC: 30.2 MG/DL (ref 8–23)
CALCIUM SERPL-MCNC: 9.2 MG/DL (ref 8.8–10.2)
CHLORIDE SERPL-SCNC: 99 MMOL/L (ref 98–107)
CHOLEST SERPL-MCNC: 160 MG/DL
CREAT SERPL-MCNC: 2.04 MG/DL (ref 0.51–0.95)
CREAT UR-MCNC: 143.6 MG/DL
DEPRECATED HCO3 PLAS-SCNC: 27 MMOL/L (ref 22–29)
ERYTHROCYTE [DISTWIDTH] IN BLOOD BY AUTOMATED COUNT: 12.4 % (ref 10–15)
GFR SERPL CREATININE-BSD FRML MDRD: 26 ML/MIN/1.73M2
GLUCOSE SERPL-MCNC: 378 MG/DL (ref 70–99)
HBA1C MFR BLD: 12.2 % (ref 4–6.2)
HCT VFR BLD AUTO: 37.4 % (ref 35–47)
HDLC SERPL-MCNC: 44 MG/DL
HGB BLD-MCNC: 12.4 G/DL (ref 11.7–15.7)
HOLD SPECIMEN: NORMAL
LDLC SERPL CALC-MCNC: 78 MG/DL
MCH RBC QN AUTO: 29.5 PG (ref 26.5–33)
MCHC RBC AUTO-ENTMCNC: 33.2 G/DL (ref 31.5–36.5)
MCV RBC AUTO: 89 FL (ref 78–100)
MICROALBUMIN UR-MCNC: 20.5 MG/L
MICROALBUMIN/CREAT UR: 14.28 MG/G CR (ref 0–25)
NONHDLC SERPL-MCNC: 116 MG/DL
PHOSPHATE SERPL-MCNC: 4 MG/DL (ref 2.5–4.5)
PLATELET # BLD AUTO: 142 10E3/UL (ref 150–450)
POTASSIUM SERPL-SCNC: 4.7 MMOL/L (ref 3.4–5.3)
RBC # BLD AUTO: 4.2 10E6/UL (ref 3.8–5.2)
SODIUM SERPL-SCNC: 135 MMOL/L (ref 136–145)
TRIGL SERPL-MCNC: 189 MG/DL
TSH SERPL DL<=0.005 MIU/L-ACNC: 2.63 UIU/ML (ref 0.3–4.2)
WBC # BLD AUTO: 5 10E3/UL (ref 4–11)

## 2023-02-08 PROCEDURE — 85027 COMPLETE CBC AUTOMATED: CPT | Mod: ZL | Performed by: FAMILY MEDICINE

## 2023-02-08 PROCEDURE — G0463 HOSPITAL OUTPT CLINIC VISIT: HCPCS | Mod: 25

## 2023-02-08 PROCEDURE — 80048 BASIC METABOLIC PNL TOTAL CA: CPT | Mod: ZL | Performed by: FAMILY MEDICINE

## 2023-02-08 PROCEDURE — 90677 PCV20 VACCINE IM: CPT

## 2023-02-08 PROCEDURE — 84443 ASSAY THYROID STIM HORMONE: CPT | Mod: ZL | Performed by: FAMILY MEDICINE

## 2023-02-08 PROCEDURE — 80061 LIPID PANEL: CPT | Mod: ZL | Performed by: FAMILY MEDICINE

## 2023-02-08 PROCEDURE — 91312 COVID-19 VACCINE BIVALENT BOOSTER 12+ (PFIZER): CPT

## 2023-02-08 PROCEDURE — 36415 COLL VENOUS BLD VENIPUNCTURE: CPT | Mod: ZL | Performed by: FAMILY MEDICINE

## 2023-02-08 PROCEDURE — 83970 ASSAY OF PARATHORMONE: CPT | Mod: ZL | Performed by: FAMILY MEDICINE

## 2023-02-08 PROCEDURE — G0439 PPPS, SUBSEQ VISIT: HCPCS | Performed by: FAMILY MEDICINE

## 2023-02-08 PROCEDURE — 84460 ALANINE AMINO (ALT) (SGPT): CPT | Mod: ZL | Performed by: FAMILY MEDICINE

## 2023-02-08 PROCEDURE — 82570 ASSAY OF URINE CREATININE: CPT | Mod: ZL | Performed by: FAMILY MEDICINE

## 2023-02-08 PROCEDURE — 99214 OFFICE O/P EST MOD 30 MIN: CPT | Mod: 25 | Performed by: FAMILY MEDICINE

## 2023-02-08 PROCEDURE — 84100 ASSAY OF PHOSPHORUS: CPT | Mod: ZL | Performed by: FAMILY MEDICINE

## 2023-02-08 PROCEDURE — 90662 IIV NO PRSV INCREASED AG IM: CPT

## 2023-02-08 PROCEDURE — 83036 HEMOGLOBIN GLYCOSYLATED A1C: CPT | Mod: ZL | Performed by: FAMILY MEDICINE

## 2023-02-08 RX ORDER — SPIRONOLACTONE 25 MG/1
25 TABLET ORAL DAILY
Qty: 90 TABLET | Refills: 4 | Status: SHIPPED | OUTPATIENT
Start: 2023-02-08 | End: 2024-04-01

## 2023-02-08 RX ORDER — LISINOPRIL 10 MG/1
10 TABLET ORAL DAILY
Qty: 90 TABLET | Refills: 3 | Status: SHIPPED | OUTPATIENT
Start: 2023-02-08 | End: 2024-04-01

## 2023-02-08 RX ORDER — GLYBURIDE 5 MG/1
5 TABLET ORAL
Qty: 90 TABLET | Refills: 4 | Status: SHIPPED | OUTPATIENT
Start: 2023-02-08 | End: 2024-04-01

## 2023-02-08 RX ORDER — SIMVASTATIN 10 MG
10 TABLET ORAL DAILY
Qty: 90 TABLET | Refills: 4 | Status: SHIPPED | OUTPATIENT
Start: 2023-02-08 | End: 2024-04-01

## 2023-02-08 ASSESSMENT — ENCOUNTER SYMPTOMS
PALPITATIONS: 0
MYALGIAS: 0
HEMATOCHEZIA: 0
HEARTBURN: 0
FREQUENCY: 0
HEMATURIA: 0
DIARRHEA: 0
SORE THROAT: 0
EYE PAIN: 0
ABDOMINAL PAIN: 0
COUGH: 0
FEVER: 0
HEADACHES: 0
CONSTIPATION: 0
PARESTHESIAS: 0
BREAST MASS: 0
ARTHRALGIAS: 1
CHILLS: 0
DYSURIA: 0
WEAKNESS: 0
NERVOUS/ANXIOUS: 0
DIZZINESS: 0
NAUSEA: 0
JOINT SWELLING: 0
SHORTNESS OF BREATH: 0

## 2023-02-08 ASSESSMENT — ACTIVITIES OF DAILY LIVING (ADL): CURRENT_FUNCTION: NO ASSISTANCE NEEDED

## 2023-02-08 ASSESSMENT — PAIN SCALES - GENERAL: PAINLEVEL: NO PAIN (0)

## 2023-02-08 NOTE — PATIENT INSTRUCTIONS
Patient Education   Personalized Prevention Plan  You are due for the preventive services outlined below.  Your care team is available to assist you in scheduling these services.  If you have already completed any of these items, please share that information with your care team to update in your medical record.  Health Maintenance Due   Topic Date Due     Heart Failure Action Plan  Never done     Parathyroid Lab  Never done     Phosphorous Lab  Never done     Eye Exam  Never done     LUNG CANCER SCREENING  Never done     Thyroid Function Lab  05/10/2020     Diabetic Foot Exam  05/10/2020     Pneumococcal Vaccine (2 - PCV) 05/10/2020     Kidney Microalbumin Urine Test  11/17/2020     COVID-19 Vaccine (4 - Booster for Pfizer series) 12/01/2021     A1C Lab  04/14/2022     Basic Metabolic Panel  04/14/2022     Hemoglobin  04/14/2022     Flu Vaccine (1) 09/01/2022     Liver Monitoring Lab  10/14/2022     Cholesterol Lab  10/14/2022     Complete Blood Count  10/14/2022

## 2023-02-08 NOTE — NURSING NOTE
"Chief Complaint   Patient presents with     Medicare Visit       Initial /80   Pulse 65   Temp 97.5  F (36.4  C) (Tympanic)   Resp 16   Wt 120.6 kg (265 lb 12.8 oz)   SpO2 99%   BMI 41.63 kg/m   Estimated body mass index is 41.63 kg/m  as calculated from the following:    Height as of 10/14/21: 1.702 m (5' 7\").    Weight as of this encounter: 120.6 kg (265 lb 12.8 oz).  Medication Reconciliation: complete    FOOD SECURITY SCREENING QUESTIONS  Hunger Vital Signs:  Within the past 12 months we worried whether our food would run out before we got money to buy more. Never  Within the past 12 months the food we bought just didn't last and we didn't have money to get more. Never  Cecelia Reardon LPN 2/8/2023 9:08 AM      "

## 2023-02-08 NOTE — PROGRESS NOTES
"SUBJECTIVE:   Glenys is a 67 year old who presents for Preventive Visit.    Patient has been advised of split billing requirements and indicates understanding: Yes  Are you in the first 12 months of your Medicare coverage?  No    Healthy Habits:     In general, how would you rate your overall health?  Good    Frequency of exercise:  2-3 days/week    Duration of exercise:  30-45 minutes    Do you usually eat at least 4 servings of fruit and vegetables a day, include whole grains    & fiber and avoid regularly eating high fat or \"junk\" foods?  Yes    Taking medications regularly:  Yes    Medication side effects:  None    Ability to successfully perform activities of daily living:  No assistance needed    Home Safety:  No safety concerns identified    Hearing Impairment:  No hearing concerns    In the past 6 months, have you been bothered by leaking of urine?  No    In general, how would you rate your overall mental or emotional health?  Excellent      PHQ-2 Total Score: 0    Additional concerns today:  No      Have you ever done Advance Care Planning? (For example, a Health Directive, POLST, or a discussion with a medical provider or your loved ones about your wishes): No, advance care planning information given to patient to review.  Patient declined advance care planning discussion at this time.       Fall risk  Fallen 2 or more times in the past year?: No  Any fall with injury in the past year?: No    Cognitive Screening   1) Repeat 3 items (Leader, Season, Winter)    2) Clock draw: NORMAL  3) 3 item recall: Recalls 3 objects  Results: 3 items recalled: COGNITIVE IMPAIRMENT LESS LIKELY    Mini-CogTM Copyright GERMAN Padgett. Licensed by the author for use in [facility]; reprinted with permission ([email or web address]). All rights reserved.      Do you have sleep apnea, excessive snoring or daytime drowsiness?: no    Reviewed and updated as needed this visit by clinical staff   Tobacco  Allergies  Meds   Med Hx  " Surg Hx  Fam Hx  Soc Hx        Reviewed and updated as needed this visit by Provider                 Social History     Tobacco Use     Smoking status: Former     Packs/day: 0.50     Years: 20.00     Pack years: 10.00     Types: Cigarettes     Smokeless tobacco: Never   Substance Use Topics     Alcohol use: No     Alcohol/week: 0.0 standard drinks     If you drink alcohol do you typically have >3 drinks per day or >7 drinks per week? No    Alcohol Use 2/7/2023   Prescreen: >3 drinks/day or >7 drinks/week? No               Current providers sharing in care for this patient include:    Patient Care Team:  Ton Chaudhry MD as PCP - General (Family Practice)  Ton Chaudhry MD as Assigned PCP    The following health maintenance items are reviewed in Epic and correct as of today:  Health Maintenance   Topic Date Due     HF ACTION PLAN  Never done     PARATHYROID  Never done     PHOSPHORUS  Never done     EYE EXAM  Never done     LUNG CANCER SCREENING  Never done     TSH W/FREE T4 REFLEX  05/10/2020     DIABETIC FOOT EXAM  05/10/2020     Pneumococcal Vaccine: 65+ Years (2 - PCV) 05/10/2020     MICROALBUMIN  11/17/2020     COVID-19 Vaccine (4 - Booster for Pfizer series) 12/01/2021     A1C  04/14/2022     BMP  04/14/2022     HEMOGLOBIN  04/14/2022     INFLUENZA VACCINE (1) 09/01/2022     MEDICARE ANNUAL WELLNESS VISIT  10/14/2022     ALT  10/14/2022     LIPID  10/14/2022     CBC  10/14/2022     MAMMO SCREENING  10/07/2023     FALL RISK ASSESSMENT  02/08/2024     COLORECTAL CANCER SCREENING  09/01/2026     ADVANCE CARE PLANNING  10/15/2026     DTAP/TDAP/TD IMMUNIZATION (3 - Td or Tdap) 08/17/2030     DEXA  10/15/2036     HEPATITIS C SCREENING  Completed     PHQ-2 (once per calendar year)  Completed     URINALYSIS  Completed     ALK PHOS  Completed     ZOSTER IMMUNIZATION  Completed     IPV IMMUNIZATION  Aged Out     MENINGITIS IMMUNIZATION  Aged Out     PAP  Discontinued     Labs reviewed in EPIC  Current Outpatient  Medications   Medication Sig Dispense Refill     aspirin 81 MG chewable tablet Take 81 mg by mouth daily with food       B-D ULTRA-FINE 33 LANCETS MISC 1 each by In Vitro route 2 times daily 200 each 6     bisoprolol (ZEBETA) 5 MG tablet Take 1 tablet by mouth twice daily 180 tablet 0     blood glucose (NO BRAND SPECIFIED) test strip 2 times daily. Dispense item covered by pt ins. Type 2 diabetes mellitus without insulin 50 strip 11     blood glucose monitoring (NO BRAND SPECIFIED) meter device kit Use to test blood sugar 1 times daily or as directed. 1 kit 0     Blood Pressure Monitoring (RA BLOOD PRESSURE CUFF MONITOR) MISC        glyBURIDE (DIABETA /MICRONASE) 5 MG tablet Take 1 tablet by mouth once daily with breakfast 90 tablet 0     lisinopril (ZESTRIL) 10 MG tablet Take 1 tablet (10 mg) by mouth daily 90 tablet 3     simvastatin (ZOCOR) 10 MG tablet Take 1 tablet by mouth once daily 90 tablet 0     spironolactone (ALDACTONE) 25 MG tablet Take 1 tablet by mouth once daily 90 tablet 0     Allergies   Allergen Reactions     Azithromycin Rash     Pneumonia Vaccine:due  Any new diagnosis of family breast, ovarian, or bowel cancer? No    FHS-7:   Breast CA Risk Assessment (FHS-7) 10/7/2021   Did any of your first-degree relatives have breast or ovarian cancer? No   Did any of your relatives have bilateral breast cancer? No   Did any man in your family have breast cancer? No   Did any woman in your family have breast and ovarian cancer? No   Did any woman in your family have breast cancer before age 50 y? No   Do you have 2 or more relatives with breast and/or ovarian cancer? No         Pertinent mammograms are reviewed under the imaging tab.    Review of Systems   Constitutional: Negative for chills and fever.   HENT: Negative for congestion, ear pain, hearing loss and sore throat.    Eyes: Negative for pain and visual disturbance.   Respiratory: Negative for cough and shortness of breath.    Cardiovascular:  Negative for chest pain, palpitations and peripheral edema.   Gastrointestinal: Negative for abdominal pain, constipation, diarrhea, heartburn, hematochezia and nausea.   Breasts:  Negative for tenderness, breast mass and discharge.   Genitourinary: Negative for dysuria, frequency, genital sores, hematuria, pelvic pain, urgency, vaginal bleeding and vaginal discharge.   Musculoskeletal: Positive for arthralgias. Negative for joint swelling and myalgias.   Skin: Negative for rash.   Neurological: Negative for dizziness, weakness, headaches and paresthesias.   Psychiatric/Behavioral: Negative for mood changes. The patient is not nervous/anxious.      Sees cardiology, recent pacer interrogation showed a fib runs up to 7 hours. Was advised to start a DOAC, has not yet due mostly to cost.     Checking sugars twice daily, over 300 most mornings, fasting. No eye check.  Lots of family illness last year and is now dedicated to her health again.  Very active, exercises daily mostly, swims in the summer.  No chest pain with this.    Recent Labs   Lab Test 08/25/22  1546 10/14/21  0948 08/17/20  1130 08/17/20  1129 05/10/19  0904   A1C  --  7.8* 8.4*  --  7.6*   LDL  --  95  --  74 75   HDL  --  47  --  42 47   TRIG  --  144  --  119 109   ALT  --  15  --  16  --    CR 2.15* 1.88*  --  1.98* 1.82*   GFRESTIMATED 25* 28*  --  25* 28*   GFRESTBLACK  --   --   --  31* 34*   POTASSIUM  --  5.0  --  5.0 5.3*        Has been waiting for a consult with nephrology, which she reports keeps getting bumped by them.  Uses no NSAIDS.    Review current opioid prescriptions    For a patient with a current opioid prescription:    Reviewed potential Opioid Use Disorder (OUD) risk factors:   Has none    Evaluate their pain severity and current treatment plan:     Provide information on non-opioid treatment options:    Refer to a specialist, as appropriate:    Get more information on pain management in the [facility] Pain Management Best Practices  "Inter-agency Task Force Report    Screen for potential Substance Use Disorders (SUDs)    Reviewed the patient s potential risk factors for SUDs: Yes ,low risk    Refer to treatment or specialist, as appropriate:     A screening tool isn t required but you may use one:  Find more information in the National Mobile on Drug Abuse Screening and Assessment Tools Chart          OBJECTIVE:   /80   Pulse 65   Temp 97.5  F (36.4  C) (Tympanic)   Resp 16   Wt 120.6 kg (265 lb 12.8 oz)   SpO2 99%   BMI 41.63 kg/m   Estimated body mass index is 41.63 kg/m  as calculated from the following:    Height as of 10/14/21: 1.702 m (5' 7\").    Weight as of this encounter: 120.6 kg (265 lb 12.8 oz).  Physical Exam  GENERAL APPEARANCE: healthy, alert and no distress  EYES: Eyes grossly normal to inspection, PERRL and conjunctivae and sclerae normal  HENT: ear canals and TM's normal, nose and mouth without ulcers or lesions, oropharynx clear and oral mucous membranes moist  NECK: no adenopathy, no asymmetry, masses, or scars and thyroid normal to palpation  RESP: lungs clear to auscultation - no rales, rhonchi or wheezes  CV: regular rate and rhythm, normal S1 S2, no S3 or S4, no murmur, click or rub, no peripheral edema and peripheral pulses strong  ABDOMEN: soft, nontender, no hepatosplenomegaly, no masses and bowel sounds normal  MS: no musculoskeletal defects are noted and gait is age appropriate without ataxia  SKIN: no suspicious lesions or rashes  NEURO: Normal strength and tone, sensory exam grossly normal, mentation intact and speech normal  PSYCH: mentation appears normal and affect normal/bright    Sensory exam of the foot is normal, tested with the monofilament. Good pulses, no lesions or ulcers, good peripheral pulses.    Diagnostic Test Results:  Labs reviewed in Epic  Results for orders placed or performed in visit on 02/08/23   CBC with Platelets     Status: Abnormal   Result Value Ref Range    WBC Count 5.0 " 4.0 - 11.0 10e3/uL    RBC Count 4.20 3.80 - 5.20 10e6/uL    Hemoglobin 12.4 11.7 - 15.7 g/dL    Hematocrit 37.4 35.0 - 47.0 %    MCV 89 78 - 100 fL    MCH 29.5 26.5 - 33.0 pg    MCHC 33.2 31.5 - 36.5 g/dL    RDW 12.4 10.0 - 15.0 %    Platelet Count 142 (L) 150 - 450 10e3/uL   Lipid Profile     Status: Abnormal   Result Value Ref Range    Cholesterol 160 <200 mg/dL    Triglycerides 189 (H) <150 mg/dL    Direct Measure HDL 44 (L) >=50 mg/dL    LDL Cholesterol Calculated 78 <=100 mg/dL    Non HDL Cholesterol 116 <130 mg/dL    Narrative    Cholesterol  Desirable:  <200 mg/dL    Triglycerides  Normal:  Less than 150 mg/dL  Borderline High:  150-199 mg/dL  High:  200-499 mg/dL  Very High:  Greater than or equal to 500 mg/dL    Direct Measure HDL  Female:  Greater than or equal to 50 mg/dL   Male:  Greater than or equal to 40 mg/dL    LDL Cholesterol  Desirable:  <100mg/dL  Above Desirable:  100-129 mg/dL   Borderline High:  130-159 mg/dL   High:  160-189 mg/dL   Very High:  >= 190 mg/dL    Non HDL Cholesterol  Desirable:  130 mg/dL  Above Desirable:  130-159 mg/dL  Borderline High:  160-189 mg/dL  High:  190-219 mg/dL  Very High:  Greater than or equal to 220 mg/dL   ALT     Status: Normal   Result Value Ref Range    ALT 17 10 - 35 U/L   BASIC METABOLIC PANEL     Status: Abnormal   Result Value Ref Range    Sodium 135 (L) 136 - 145 mmol/L    Potassium 4.7 3.4 - 5.3 mmol/L    Chloride 99 98 - 107 mmol/L    Carbon Dioxide (CO2) 27 22 - 29 mmol/L    Anion Gap 9 7 - 15 mmol/L    Urea Nitrogen 30.2 (H) 8.0 - 23.0 mg/dL    Creatinine 2.04 (H) 0.51 - 0.95 mg/dL    Calcium 9.2 8.8 - 10.2 mg/dL    Glucose 378 (H) 70 - 99 mg/dL    GFR Estimate 26 (L) >60 mL/min/1.73m2   HEMOGLOBIN A1C     Status: Abnormal   Result Value Ref Range    Hemoglobin A1C 12.2 (H) 4.0 - 6.2 %   Thyrotropin GH     Status: Normal   Result Value Ref Range    TSH 2.63 0.30 - 4.20 uIU/mL   Phosphorus     Status: Normal   Result Value Ref Range    Phosphorus 4.0  2.5 - 4.5 mg/dL   Parathyroid Hormone Intact     Status: Abnormal   Result Value Ref Range    Parathyroid Hormone Intact 95 (H) 15 - 65 pg/mL    Narrative    This result was obtained with the Roche Elecsys PTH STAT assay.   This reference range differs from PTH assays used in other [facility] laboratories.   Extra Tube     Status: None    Narrative    The following orders were created for panel order Extra Tube.  Procedure                               Abnormality         Status                     ---------                               -----------         ------                     Extra Serum Separator Tu...[[id number]]                      Final result                 Please view results for these tests on the individual orders.   Extra Serum Separator Tube (SST)     Status: None   Result Value Ref Range    Hold Specimen JIC    Albumin Random Urine Quantitative with Creat Ratio     Status: None   Result Value Ref Range    Creatinine Urine mg/dL 143.6 mg/dL    Albumin Urine mg/L 20.5 mg/L    Albumin Urine mg/g Cr 14.28 0.00 - 25.00 mg/g Cr       ASSESSMENT / PLAN:   (Z00.00) Encounter for Medicare annual wellness exam  (primary encounter diagnosis)  Comment: see below  Plan:      (E78.00) Elevated cholesterol  Comment: stable  Plan: ALT             (Z13.220) Screening for hyperlipidemia  Comment:    Plan: Lipid Profile             (I50.9) Acute on chronic congestive heart failure, unspecified heart failure type (H)  Comment: stable currently  Plan: CBC with Platelets             (I48.0) Paroxysmal atrial fibrillation (H)  Comment: advised DOAC, but also discussed with her ways to get on warfarin if cost is an issue. She wants to think this all over  Plan:      (Z68.41) Body mass index (BMI) 40.0-44.9, adult (H)  Comment: discussed with her weight loss to treat her diabetes   Plan:      (E11.22,  N18.32) Type 2 diabetes mellitus with stage 3b chronic kidney disease, without long-term current use of insulin  (H)  Comment: worsening  Plan: Thyrotropin GH, Albumin Random Urine         Quantitative with Creat Ratio, HEMOGLOBIN A1C,         BASIC METABOLIC PANEL, FOOT EXAM, empagliflozin        (JARDIANCE) 25 MG TABS tablet        Added on jardiance and advise aggressive diet changes    (N18.4) CKD (chronic kidney disease) stage 4, GFR 15-29 ml/min (H)  Comment:  stable  Plan: Parathyroid Hormone Intact, Phosphorus        Awaiting nephrology consult    (E11.9) Type 2 diabetes mellitus without complication, without long-term current use of insulin (H)  Comment:    Plan: lisinopril (ZESTRIL) 10 MG tablet, simvastatin         (ZOCOR) 10 MG tablet, glyBURIDE (DIABETA         /MICRONASE) 5 MG tablet             (I10) Essential hypertension  Comment: stable  Plan: spironolactone (ALDACTONE) 25 MG tablet        refilled            COUNSELING:  Reviewed preventive health counseling, as reflected in patient instructions       Regular exercise       Healthy diet/nutrition       Fall risk prevention        She reports that she has quit smoking. She has a 10.00 pack-year smoking history. She has never used smokeless tobacco.      Appropriate preventive services were discussed with this patient, including applicable screening as appropriate for cardiovascular disease, diabetes, osteopenia/osteoporosis, and glaucoma.  As appropriate for age/gender, discussed screening for colorectal cancer, prostate cancer, breast cancer, and cervical cancer. Checklist reviewing preventive services available has been given to the patient.    Reviewed patients plan of care and provided an AVS. The Intermediate Care Plan ( asthma action plan, low back pain action plan, and migraine action plan) for Glenys meets the Care Plan requirement. This Care Plan has been established and reviewed with the Patient.          Ton Chaudhry MD  [facility]    Identified Health Risks:  Answers for HPI/ROS submitted by the patient on 2/7/2023  If you  checked off any problems, how difficult have these problems made it for you to do your work, take care of things at home, or get along with other people?: Not difficult at all  PHQ9 TOTAL SCORE: 0  CEDRIC 7 TOTAL SCORE: 0

## 2023-02-09 LAB — PTH-INTACT SERPL-MCNC: 95 PG/ML (ref 15–65)

## 2023-03-28 ENCOUNTER — E-VISIT (OUTPATIENT)
Dept: FAMILY MEDICINE | Facility: OTHER | Age: 68
End: 2023-03-28
Attending: FAMILY MEDICINE
Payer: MEDICARE

## 2023-03-28 DIAGNOSIS — J01.90 ACUTE NON-RECURRENT SINUSITIS, UNSPECIFIED LOCATION: Primary | ICD-10-CM

## 2023-03-28 NOTE — TELEPHONE ENCOUNTER
"Patient was called and discussed symptoms. Due to time of day E-visit was received, patient is requesting for it to be addressed by PCP tomorrow as there is \"no immediate concern\"    Corinne R Thayer, RN on 3/28/2023 at 4:43 PM    "

## 2023-03-29 NOTE — PATIENT INSTRUCTIONS
* Sinusitis (No Antibiotics)    The sinuses are air-filled spaces within the bones of the face. They connect to the inside of the nose. Sinusitis is an inflammation of the tissue that lines the sinuses. Sinusitis can occur during a cold. It can also happen due to allergies to pollens and other particles in the air. It can cause symptoms such as sinus congestion, headache, sore throat, facial swelling, and a feeling of fullness. It may also cause a low-grade fever. Your sinusitis does not include an infection with bacteria. Because of this, antibiotics are not used to treat this problem.  Home care    Drink plenty of water, hot tea, and other liquids. This may help thin nasal mucus. It also may help your sinuses drain fluids.    Heat may help soothe painful areas of your face. Use a towel soaked in hot water. Or,  the shower and direct the warm spray onto your face. Using a vaporizer along with a menthol rub at night may also help soothe symptoms.     An expectorant with guaifenesin may help thin nasal mucus and help your sinuses drain fluids.    You can use an over-the-counter decongestant, unless a similar medicine was prescribed to you. Nasal sprays work the fastest. Use one that contains phenylephrine or oxymetazoline. First blow your nose gently. Then use the spray. Do not use these medicines more often than directed on the label. If you do, your symptoms may get worse. You may also take pills that contain pseudoephedrine. Don t use products that combine multiple medicines. This is because side effects may be increased. Read all medicine labels. You can also ask the pharmacist for help. (People with high blood pressure should not use decongestants. They can raise blood pressure.)    Over-the-counter antihistamines may help if allergies contributed to your sinusitis.      Use acetaminophen or ibuprofen to control pain, unless another pain medicine was prescribed to you. If you have chronic liver or  kidney disease or ever had a stomach ulcer, talk with your healthcare provider before using these medicines. (Aspirin should never be taken by anyone under age 18 who is ill with a fever. It may cause severe liver damage.)    Use nasal rinses or irrigation as instructed by your healthcare provider.    Don't smoke. This can make symptoms worse.  Follow-up care  Follow up with your healthcare provider or our staff if you are NOT better in 1 week.  When to seek medical advice  Call your healthcare provider if any of these occur:    Green or yellow fluid draining from your nose or into your throat    Facial pain or headache that gets worse    Stiff neck    Unusual drowsiness or confusion    Swelling of your forehead or eyelids    Vision problems, such as blurred or double vision    Fever of 100.4 F (38 C) or higher, or as directed by your healthcare provider    Seizure    Breathing problems    Symptoms that don't go away in 10 days  For informational purposes only. Not to replace the advice of your health care provider.  Copyright   2018 [facility]. All rights reserved.        Dear Glenys Ladd    After reviewing your responses, I've been able to diagnose you with Acute non-recurrent sinusitis, unspecified location.      Based on your responses and diagnosis, I have prescribed No orders of the defined types were placed in this encounter.   to treat your symptoms. I have sent this to your pharmacy.?     It is also important to stay well hydrated, get lots of rest and take over-the-counter decongestants,?tylenol?or ibuprofen if you?are able to?take those medications per your primary care provider to help relieve discomfort.?     It is important that you take?all of?your prescribed medication even if your symptoms are improving after a few doses.? Taking?all of?your medicine helps prevent the symptoms from returning.?     If your symptoms worsen, you develop severe headache, vomiting, high fever (>102), or  are not improving in 7 days, please contact your primary care provider for an appointment or visit any of our convenient Walk-in Care or Urgent Care Centers to be seen which can be found on our website?here.?     Thanks again for choosing?us?as your health care partner,?   ?  Ton Chaudhry MD?

## 2023-04-11 ENCOUNTER — E-VISIT (OUTPATIENT)
Dept: FAMILY MEDICINE | Facility: OTHER | Age: 68
End: 2023-04-11
Payer: MEDICARE

## 2023-04-11 DIAGNOSIS — J01.90 ACUTE SINUSITIS WITH SYMPTOMS > 10 DAYS: Primary | ICD-10-CM

## 2023-04-11 PROCEDURE — 99421 OL DIG E/M SVC 5-10 MIN: CPT | Performed by: FAMILY MEDICINE

## 2023-04-11 NOTE — PATIENT INSTRUCTIONS
Sinusitis (Antibiotic Treatment)    The sinuses are air-filled spaces within the bones of the face. They connect to the inside of the nose. Sinusitis is an inflammation of the tissue that lines the sinuses. Sinusitis can occur during a cold. It can also happen due to allergies to pollens and other particles in the air. Sinusitis can cause symptoms of sinus congestion and a feeling of fullness. A sinus infection causes fever, headache, and facial pain. There is often green or yellow fluid draining from the nose or into the back of the throat (post-nasal drip). You have been given antibiotics to treat this condition.   Home care    Take the full course of antibiotics as instructed. Don't stop taking them, even when you feel better.    Drink plenty of water, hot tea, and other liquids as directed by the healthcare provider. This may help thin nasal mucus. It also may help your sinuses drain fluids.    Heat may help soothe painful areas of your face. Use a towel soaked in hot water. Or,  the shower and direct the warm spray onto your face. Using a vaporizer along with a menthol rub at night may also help soothe symptoms.     An expectorant with guaifenesin may help thin nasal mucus and help your sinuses drain fluids. Talk with your provider or pharmacists before taking an over-the-counter (OTC) medicine if you have any questions about it or its side effects..    You can use an OTC decongestant, unless a similar medicine was prescribed to you. Nasal sprays work the fastest. Use one that contains phenylephrine or oxymetazoline. First blow your nose gently. Then use the spray. Don't use these medicines more often than directed on the label. If you do, your symptoms may get worse. You may also take pills that contain pseudoephedrine. Don t use products that combine multiple medicines. This is because side effects may be increased. Read labels. You can also ask the pharmacist for help. (People with high blood  pressure should not use decongestants. They can raise blood pressure.) Talk with your provider or pharmacist if you have any questions about the medicine..    OTC antihistamines may help if allergies contributed to your sinusitis. Talk with your provider or pharmacist if you have any questions about the medicine.    Nasal rinses or irrigation may help symptoms. It's very important to use these products only as directed. Use sterile water or sterile saline solution and not tap water. Tap water may contain germs that can cause infection in the brain. Don't rinse with excess pressure. this may spread the infection to other areas in your sinuses or head. Ask your healthcare provider or pharmacist if you have questions about using these products.    Use acetaminophen, naproxen, or ibuprofen to control pain, unless another pain medicine was prescribed to you. Talk with your healthcare provider before using these medicines if you have chronic liver or kidney disease or ever had a stomach ulcer. Never give aspirin to anyone under age 18 without first talking to their healthcare provider. It may cause severe liver damage.    Don't smoke. This can make symptoms worse.    Follow-up care  Follow up with your healthcare provider as advised.   When to seek medical advice  Call your healthcare provider if any of these occur:     Facial pain or headache that gets worse    Symptoms don't go away in 10 days    Fever of 100.4 F (38 C) or higher, or as directed by your healthcare provider  Call 911  Call 911 if any of these occur:     Seizure    Trouble breathing    Feeling dizzy or faint    Fingernails, skin, or lips look blue, purple, or gray    Severe headache that doesn't go away    Stiff neck    Unusual drowsiness or confusion    Vision problems such as blurred or double vision    Swelling of your forehead or eyelids  Prevention  Here are steps you can take to help prevent an infection:     Keep good hand washing habits.    Don t  have close contact with people who have sore throats, colds, or other upper respiratory infections.    Don t smoke, and stay away from secondhand smoke.    Stay up to date with all of your vaccines.  [employer] last reviewed this educational content on 1/1/2022 2000-2022 [employer]. All rights reserved. This information is not intended as a substitute for professional medical care. Always follow your healthcare professional's instructions.        Dear Glenys Ladd    After reviewing your responses, I've been able to diagnose you with Acute sinusitis with symptoms > 10 days.      Based on your responses and diagnosis, I have prescribed   Orders Placed This Encounter     amoxicillin-clavulanate (AUGMENTIN) 875-125 MG tablet    to treat your symptoms. I have sent this to your pharmacy.?     It is also important to stay well hydrated, get lots of rest and take over-the-counter decongestants,?tylenol?or ibuprofen if you?are able to?take those medications per your primary care provider to help relieve discomfort.?     It is important that you take?all of?your prescribed medication even if your symptoms are improving after a few doses.? Taking?all of?your medicine helps prevent the symptoms from returning.?     If your symptoms worsen, you develop severe headache, vomiting, high fever (>102), or are not improving in 7 days, please contact your primary care provider for an appointment or visit any of our convenient Walk-in Care or Urgent Care Centers to be seen which can be found on our website?here.?     Thanks again for choosing?us?as your health care partner,?   ?  Brendan Xie MD?

## 2023-04-26 DIAGNOSIS — E11.9 TYPE 2 DIABETES MELLITUS WITHOUT COMPLICATION, WITHOUT LONG-TERM CURRENT USE OF INSULIN (H): ICD-10-CM

## 2023-05-02 RX ORDER — BISOPROLOL FUMARATE 5 MG/1
TABLET, FILM COATED ORAL
Qty: 180 TABLET | Refills: 0 | Status: SHIPPED | OUTPATIENT
Start: 2023-05-02 | End: 2023-07-24

## 2023-05-02 NOTE — TELEPHONE ENCOUNTER
[facility] sent Rx request for the following:      Requested Prescriptions   Pending Prescriptions Disp Refills     bisoprolol (ZEBETA) 5 MG tablet [Pharmacy Med Name: Bisoprolol Fumarate 5 MG Oral Tablet] 180 tablet 0     Sig: Take 1 tablet by mouth twice daily     Last Prescription Date:   1/23/23  Last Fill Qty/Refills:         180, R-0    Last Office Visit:              2/8/23   Future Office visit:           None    Gissell Flanagan RN on 5/2/2023 at 2:49 PM

## 2023-06-02 ENCOUNTER — HOSPITAL ENCOUNTER (OUTPATIENT)
Dept: MAMMOGRAPHY | Facility: OTHER | Age: 68
Discharge: HOME | End: 2023-06-02
Attending: FAMILY MEDICINE | Admitting: FAMILY MEDICINE
Payer: MEDICARE

## 2023-06-02 DIAGNOSIS — Z12.31 VISIT FOR SCREENING MAMMOGRAM: ICD-10-CM

## 2023-06-02 PROCEDURE — 77067 SCR MAMMO BI INCL CAD: CPT

## 2023-07-22 DIAGNOSIS — E11.9 TYPE 2 DIABETES MELLITUS WITHOUT COMPLICATION, WITHOUT LONG-TERM CURRENT USE OF INSULIN (H): ICD-10-CM

## 2023-07-24 RX ORDER — BISOPROLOL FUMARATE 5 MG/1
TABLET, FILM COATED ORAL
Qty: 180 TABLET | Refills: 0 | Status: SHIPPED | OUTPATIENT
Start: 2023-07-24 | End: 2023-11-09

## 2023-08-09 ENCOUNTER — PATIENT OUTREACH (OUTPATIENT)
Dept: FAMILY MEDICINE | Facility: OTHER | Age: 68
End: 2023-08-09
Payer: MEDICARE

## 2023-08-09 NOTE — TELEPHONE ENCOUNTER
Patient Quality Outreach    Patient is due for the following:   Diabetes -  Diabetic Follow-Up Visit    Next Steps:   Schedule a office visit for Diabetes Follow-Up    Type of outreach:    Sent to Outreach to call      Questions for provider review:    None           Chelsea Santizo RN

## 2023-08-15 ENCOUNTER — PATIENT OUTREACH (OUTPATIENT)
Dept: FAMILY MEDICINE | Facility: OTHER | Age: 68
End: 2023-08-15
Payer: MEDICARE

## 2023-08-15 NOTE — TELEPHONE ENCOUNTER
Patient Quality Outreach    Patient is due for the following:   Diabetes -  A1C, Eye Exam, and Microalbumin    Next Steps:   Schedule a office visit for diabetes    Type of outreach:    Sent letter.      Questions for provider review:    None           Beatrice Casper

## 2023-10-07 ENCOUNTER — HEALTH MAINTENANCE LETTER (OUTPATIENT)
Age: 68
End: 2023-10-07

## 2023-11-09 DIAGNOSIS — E11.9 TYPE 2 DIABETES MELLITUS WITHOUT COMPLICATION, WITHOUT LONG-TERM CURRENT USE OF INSULIN (H): Primary | ICD-10-CM

## 2023-11-09 RX ORDER — BISOPROLOL FUMARATE 5 MG/1
TABLET, FILM COATED ORAL
Qty: 180 TABLET | Refills: 0 | Status: SHIPPED | OUTPATIENT
Start: 2023-11-09

## 2023-11-09 NOTE — TELEPHONE ENCOUNTER
[facility] sent Rx request for the following:      Requested Prescriptions   Pending Prescriptions Disp Refills    bisoprolol (ZEBETA) 5 MG tablet [Pharmacy Med Name: Bisoprolol Fumarate 5 MG Oral Tablet] 180 tablet 0     Sig: Take 1 tablet by mouth twice daily     Last Prescription Date:   7/24/23  Last Fill Qty/Refills:         180, R-0    Last Office Visit:              2/8/23   Future Office visit:           none    Routing to provider for refill consideration.  Jasmyne Rivera RN on 11/9/2023 at 4:17 PM

## 2024-03-01 ENCOUNTER — MYC REFILL (OUTPATIENT)
Dept: FAMILY MEDICINE | Facility: OTHER | Age: 69
End: 2024-03-01
Payer: MEDICARE

## 2024-03-01 DIAGNOSIS — E11.9 TYPE 2 DIABETES MELLITUS WITHOUT COMPLICATION, WITHOUT LONG-TERM CURRENT USE OF INSULIN (H): ICD-10-CM

## 2024-03-06 ENCOUNTER — TRANSFERRED RECORDS (OUTPATIENT)
Dept: HEALTH INFORMATION MANAGEMENT | Facility: CLINIC | Age: 69
End: 2024-03-06

## 2024-03-07 ENCOUNTER — MEDICAL CORRESPONDENCE (OUTPATIENT)
Dept: HEALTH INFORMATION MANAGEMENT | Facility: OTHER | Age: 69
End: 2024-03-07
Payer: MEDICARE

## 2024-03-07 ENCOUNTER — TELEPHONE (OUTPATIENT)
Dept: FAMILY MEDICINE | Facility: OTHER | Age: 69
End: 2024-03-07
Payer: MEDICARE

## 2024-03-07 NOTE — TELEPHONE ENCOUNTER
"[id number] sent Rx request for the following:      Requested Prescriptions   Pending Prescriptions Disp Refills    blood glucose (NO BRAND SPECIFIED) test strip 50 strip 11     Si times daily. Dispense item covered by pt ins. Type 2 diabetes mellitus without insulin       Diabetic Supplies Protocol Passed - 3/7/2024  9:19 AM        Passed - Medication is active on med list        Passed - Medication indicated for associated diagnosis        Passed - Patient is 18 years of age or older        Passed - Recent (6 mo) or future (30 days) visit within the authorizing provider's specialty     Patient had office visit in the last 6 months or has a visit in the next 30 days with authorizing provider.  See \"Patient Info\" tab in inbasket, or \"Choose Columns\" in Meds & Orders section of the refill encounter.                 Last Prescription Date:   10/14/21  Last Fill Qty/Refills:         50, R-11    Last Office Visit:              23   Future Office visit:           24    Prescription approved per [facility] Refill Protocol.  Salma Herron RN on 3/7/2024 at 9:20 AM      "

## 2024-03-07 NOTE — TELEPHONE ENCOUNTER
Chart accessed to gather information needed to fill out CMN form. Form routed to PCP's physical inbasket for completion/signing. Haylie Priest RN .............. 3/7/2024  3:32 PM

## 2024-03-29 SDOH — HEALTH STABILITY: PHYSICAL HEALTH: ON AVERAGE, HOW MANY DAYS PER WEEK DO YOU ENGAGE IN MODERATE TO STRENUOUS EXERCISE (LIKE A BRISK WALK)?: 4 DAYS

## 2024-03-29 SDOH — HEALTH STABILITY: PHYSICAL HEALTH: ON AVERAGE, HOW MANY MINUTES DO YOU ENGAGE IN EXERCISE AT THIS LEVEL?: 30 MIN

## 2024-03-29 ASSESSMENT — SOCIAL DETERMINANTS OF HEALTH (SDOH): HOW OFTEN DO YOU GET TOGETHER WITH FRIENDS OR RELATIVES?: MORE THAN THREE TIMES A WEEK

## 2024-04-01 ENCOUNTER — OFFICE VISIT (OUTPATIENT)
Dept: FAMILY MEDICINE | Facility: OTHER | Age: 69
End: 2024-04-01
Attending: FAMILY MEDICINE
Payer: MEDICARE

## 2024-04-01 VITALS
SYSTOLIC BLOOD PRESSURE: 116 MMHG | OXYGEN SATURATION: 98 % | RESPIRATION RATE: 16 BRPM | BODY MASS INDEX: 40.57 KG/M2 | TEMPERATURE: 98.2 F | WEIGHT: 259 LBS | HEART RATE: 60 BPM | DIASTOLIC BLOOD PRESSURE: 80 MMHG

## 2024-04-01 DIAGNOSIS — I50.9 ACUTE ON CHRONIC CONGESTIVE HEART FAILURE, UNSPECIFIED HEART FAILURE TYPE (H): ICD-10-CM

## 2024-04-01 DIAGNOSIS — I10 ESSENTIAL HYPERTENSION: ICD-10-CM

## 2024-04-01 DIAGNOSIS — E11.22 TYPE 2 DIABETES MELLITUS WITH STAGE 3B CHRONIC KIDNEY DISEASE, WITHOUT LONG-TERM CURRENT USE OF INSULIN (H): ICD-10-CM

## 2024-04-01 DIAGNOSIS — I48.0 PAROXYSMAL ATRIAL FIBRILLATION (H): ICD-10-CM

## 2024-04-01 DIAGNOSIS — E11.9 TYPE 2 DIABETES MELLITUS WITHOUT COMPLICATION, WITHOUT LONG-TERM CURRENT USE OF INSULIN (H): ICD-10-CM

## 2024-04-01 DIAGNOSIS — Z29.11 NEED FOR VACCINATION AGAINST RESPIRATORY SYNCYTIAL VIRUS: ICD-10-CM

## 2024-04-01 DIAGNOSIS — Z00.00 ENCOUNTER FOR MEDICARE ANNUAL WELLNESS EXAM: Primary | ICD-10-CM

## 2024-04-01 DIAGNOSIS — E78.00 ELEVATED CHOLESTEROL: ICD-10-CM

## 2024-04-01 DIAGNOSIS — E66.01 MORBID OBESITY WITH BMI OF 40.0-44.9, ADULT (H): ICD-10-CM

## 2024-04-01 DIAGNOSIS — N18.32 TYPE 2 DIABETES MELLITUS WITH STAGE 3B CHRONIC KIDNEY DISEASE, WITHOUT LONG-TERM CURRENT USE OF INSULIN (H): ICD-10-CM

## 2024-04-01 DIAGNOSIS — N18.4 CHRONIC KIDNEY DISEASE, STAGE 4 (SEVERE) (H): ICD-10-CM

## 2024-04-01 LAB
ALT SERPL W P-5'-P-CCNC: 20 U/L (ref 0–50)
ANION GAP SERPL CALCULATED.3IONS-SCNC: 10 MMOL/L (ref 7–15)
BUN SERPL-MCNC: 20.7 MG/DL (ref 8–23)
CALCIUM SERPL-MCNC: 9.3 MG/DL (ref 8.8–10.2)
CHLORIDE SERPL-SCNC: 102 MMOL/L (ref 98–107)
CHOLEST SERPL-MCNC: 164 MG/DL
CREAT SERPL-MCNC: 1.96 MG/DL (ref 0.51–0.95)
CREAT UR-MCNC: 134.9 MG/DL
DEPRECATED HCO3 PLAS-SCNC: 27 MMOL/L (ref 22–29)
EGFRCR SERPLBLD CKD-EPI 2021: 27 ML/MIN/1.73M2
ERYTHROCYTE [DISTWIDTH] IN BLOOD BY AUTOMATED COUNT: 12.7 % (ref 10–15)
FASTING STATUS PATIENT QL REPORTED: YES
GLUCOSE SERPL-MCNC: 236 MG/DL (ref 70–99)
HBA1C MFR BLD: 10.6 % (ref 4–6.2)
HCT VFR BLD AUTO: 40.3 % (ref 35–47)
HDLC SERPL-MCNC: 38 MG/DL
HGB BLD-MCNC: 12.7 G/DL (ref 11.7–15.7)
LDLC SERPL CALC-MCNC: 87 MG/DL
MCH RBC QN AUTO: 29.1 PG (ref 26.5–33)
MCHC RBC AUTO-ENTMCNC: 31.5 G/DL (ref 31.5–36.5)
MCV RBC AUTO: 92 FL (ref 78–100)
MICROALBUMIN UR-MCNC: 75.2 MG/L
MICROALBUMIN/CREAT UR: 55.74 MG/G CR (ref 0–25)
NONHDLC SERPL-MCNC: 126 MG/DL
PLATELET # BLD AUTO: 155 10E3/UL (ref 150–450)
POTASSIUM SERPL-SCNC: 4.8 MMOL/L (ref 3.4–5.3)
RBC # BLD AUTO: 4.36 10E6/UL (ref 3.8–5.2)
SODIUM SERPL-SCNC: 139 MMOL/L (ref 135–145)
TRIGL SERPL-MCNC: 196 MG/DL
WBC # BLD AUTO: 5 10E3/UL (ref 4–11)

## 2024-04-01 PROCEDURE — 82570 ASSAY OF URINE CREATININE: CPT | Mod: ZL | Performed by: FAMILY MEDICINE

## 2024-04-01 PROCEDURE — 85048 AUTOMATED LEUKOCYTE COUNT: CPT | Mod: ZL | Performed by: FAMILY MEDICINE

## 2024-04-01 PROCEDURE — 83036 HEMOGLOBIN GLYCOSYLATED A1C: CPT | Mod: ZL | Performed by: FAMILY MEDICINE

## 2024-04-01 PROCEDURE — 84460 ALANINE AMINO (ALT) (SGPT): CPT | Mod: ZL | Performed by: FAMILY MEDICINE

## 2024-04-01 PROCEDURE — 80048 BASIC METABOLIC PNL TOTAL CA: CPT | Mod: ZL | Performed by: FAMILY MEDICINE

## 2024-04-01 PROCEDURE — 82465 ASSAY BLD/SERUM CHOLESTEROL: CPT | Mod: ZL | Performed by: FAMILY MEDICINE

## 2024-04-01 PROCEDURE — 99214 OFFICE O/P EST MOD 30 MIN: CPT | Mod: 25 | Performed by: FAMILY MEDICINE

## 2024-04-01 PROCEDURE — 36415 COLL VENOUS BLD VENIPUNCTURE: CPT | Mod: ZL | Performed by: FAMILY MEDICINE

## 2024-04-01 PROCEDURE — 90480 ADMN SARSCOV2 VAC 1/ONLY CMP: CPT

## 2024-04-01 PROCEDURE — G0463 HOSPITAL OUTPT CLINIC VISIT: HCPCS | Mod: 25

## 2024-04-01 PROCEDURE — G0439 PPPS, SUBSEQ VISIT: HCPCS | Performed by: FAMILY MEDICINE

## 2024-04-01 RX ORDER — SPIRONOLACTONE 25 MG/1
25 TABLET ORAL DAILY
Qty: 90 TABLET | Refills: 4 | Status: SHIPPED | OUTPATIENT
Start: 2024-04-01

## 2024-04-01 RX ORDER — RESPIRATORY SYNCYTIAL VIRUS VACCINE 120MCG/0.5
0.5 KIT INTRAMUSCULAR ONCE
Qty: 1 EACH | Refills: 0 | Status: SHIPPED | OUTPATIENT
Start: 2024-04-01 | End: 2024-04-01

## 2024-04-01 RX ORDER — LISINOPRIL 10 MG/1
10 TABLET ORAL DAILY
Qty: 90 TABLET | Refills: 4 | Status: SHIPPED | OUTPATIENT
Start: 2024-04-01

## 2024-04-01 RX ORDER — SIMVASTATIN 10 MG
10 TABLET ORAL DAILY
Qty: 90 TABLET | Refills: 4 | Status: SHIPPED | OUTPATIENT
Start: 2024-04-01

## 2024-04-01 RX ORDER — LIRAGLUTIDE 6 MG/ML
0.6 INJECTION SUBCUTANEOUS DAILY
Qty: 9 ML | Refills: 4 | Status: SHIPPED | OUTPATIENT
Start: 2024-04-01 | End: 2024-07-01

## 2024-04-01 RX ORDER — GLYBURIDE 5 MG/1
5 TABLET ORAL
Qty: 90 TABLET | Refills: 4 | Status: SHIPPED | OUTPATIENT
Start: 2024-04-01 | End: 2024-07-05

## 2024-04-01 ASSESSMENT — PAIN SCALES - GENERAL: PAINLEVEL: NO PAIN (0)

## 2024-04-01 NOTE — PROGRESS NOTES
Preventive Care Visit  [facility] AND [facility]  Ton Chaudhry MD, Family Medicine  Apr 1, 2024      Assessment & Plan     (Z00.00) Encounter for Medicare annual wellness exam  (primary encounter diagnosis)  Comment:    Plan:      (E11.22,  N18.32) Type 2 diabetes mellitus with stage 3b chronic kidney disease, without long-term current use of insulin (H)  Comment: she has not been addressing her own health needs for the last year, but is now ready to tackle this. I added on the victoza today, and should follow up in 3 months.   Plan: empagliflozin (JARDIANCE) 25 MG TABS tablet,         HEMOGLOBIN A1C, BASIC METABOLIC PANEL, Albumin         Random Urine Quantitative with Creat Ratio,         Lipid Profile, Hemoglobin             (E11.9) Type 2 diabetes mellitus without complication, without long-term current use of insulin (H)  Comment:    Plan: glyBURIDE (DIABETA /MICRONASE) 5 MG tablet,         lisinopril (ZESTRIL) 10 MG tablet, simvastatin         (ZOCOR) 10 MG tablet, liraglutide (VICTOZA) 18         MG/3ML solution, insulin pen needle (32G X 6         MM) 32G X 6 MM miscellaneous             (I10) Essential hypertension  Comment: is at goal, refilled without change  Plan: spironolactone (ALDACTONE) 25 MG tablet             (Z29.11) Need for vaccination against respiratory syncytial virus  Comment:    Plan: respiratory syncytial virus vaccine, bivalent         (ABRYSVO) injection             (E78.00) Elevated cholesterol  Comment:    Plan: ALT             (I50.9) Acute on chronic congestive heart failure, unspecified heart failure type (H)  Comment: is managed actively by cardiology. Is on an SGLT 2  Plan: CBC with Platelets             (I48.0) Paroxysmal atrial fibrillation (H)  Comment: in normal sinus rhythm on exam, followd actively by cardiology.   Plan:      (E66.01,  Z68.41) Morbid obesity with BMI of 40.0-44.9, adult (H)  Comment: advised wt loss to help with her hypertension and diabetes. Added  on victoza today which will help with her diabetes as well as the obesity.   Plan:      (N18.4) Chronic kidney disease, stage 4 (severe) (H)  Comment: this has remained stable for years, presumed therefore to be due to her diabetes and hypertension. She is willing to start dialysis if this progresses so will arrange a nephrology consult again.   Plan: Adult Nephrology  Referral                       Counseling  Appropriate preventive services were discussed with this patient, including applicable screening as appropriate for fall prevention, nutrition, physical activity, Tobacco-use cessation, weight loss and cognition.  Checklist reviewing preventive services available has been given to the patient.  Reviewed patient's diet, addressing concerns and/or questions.   The patient was instructed to see the dentist every 6 months.           No follow-ups on file.    Josh Veronica is a 68 year old, presenting for the following:  Diabetes and Medicare Visit        4/1/2024     9:22 AM   Additional Questions   Roomed by ARON Rai   Accompanied by Self         4/1/2024     9:22 AM   Patient Reported Additional Medications   Patient reports taking the following new medications N/A         Health Care Directive  Patient does not have a Health Care Directive or Living Will: Discussed advance care planning with patient; information given to patient to review.    History of Present Illness       Diabetes:   She presents for follow up of diabetes.  She is checking home blood glucose one time daily.   She checks blood glucose before meals.  Blood glucose is sometimes over 200 and never under 70. She is aware of hypoglycemia symptoms including shakiness.   She is concerned about blood sugar frequently over 200.   She is having numbness in feet, burning in feet and blurry vision.  The patient has not had a diabetic eye exam in the last 12 months.           Has not had follow up with me in over a year. Was tending  to a sick sister out of town, lots of stress. Is now dedicated to taking care of her own health issues.     She sees cardiology, says last visit was about 6 months ago. Those notes are not available. He is now with [facility]. She says she is not on anticoagulation and he told her not to be but she does not know why.     Last year I ordered a nephrology consult, and she says she was called but told they specialist was full and could not see her.             3/29/2024   General Health   How would you rate your overall physical health? Good   Feel stress (tense, anxious, or unable to sleep) Not at all         3/29/2024   Nutrition   Diet: I don't know         3/29/2024   Exercise   Days per week of moderate/strenous exercise 4 days   Average minutes spent exercising at this level 30 min         3/29/2024   Social Factors   Frequency of gathering with friends or relatives More than three times a week   Worry food won't last until get money to buy more No   Food not last or not have enough money for food? No   Do you have housing?  Yes   Are you worried about losing your housing? No   Lack of transportation? No   Unable to get utilities (heat,electricity)? No         3/29/2024   Activities of Daily Living- Home Safety   Needs help with the following daily activites None of the above   Safety concerns in the home None of the above         3/29/2024   Dental   Dentist two times every year? (!) NO         3/29/2024   Hearing Screening   Hearing concerns? None of the above         3/29/2024   Driving Risk Screening   Patient/family members have concerns about driving No         3/29/2024   General Alertness/Fatigue Screening   Have you been more tired than usual lately? No         3/29/2024   Urinary Incontinence Screening   Bothered by leaking urine in past 6 months No         3/29/2024   TB Screening   Were you born outside of the [place]? No         Today's PHQ-2 Score:       4/1/2024     9:08 AM   PHQ-2 ( 1999 Pf[employer])   Q1:  Little interest or pleasure in doing things 0   Q2: Feeling down, depressed or hopeless 0   PHQ-2 Score 0   Q1: Little interest or pleasure in doing things Not at all   Q2: Feeling down, depressed or hopeless Not at all   PHQ-2 Score 0           3/29/2024   Substance Use   Alcohol more than 3/day or more than 7/wk No   Do you have a current opioid prescription? No   How severe/bad is pain from 1 to 10? 0/10 (No Pain)   Do you use any other substances recreationally? No     Social History     Tobacco Use    Smoking status: Former     Packs/day: 0.50     Years: 20.00     Additional pack years: 0.00     Total pack years: 10.00     Types: Cigarettes    Smokeless tobacco: Never   Vaping Use    Vaping Use: Never used   Substance Use Topics    Alcohol use: No     Alcohol/week: 0.0 standard drinks of alcohol    Drug use: No             3/29/2024   Breast Cancer Screening   Family history of breast, colon, or ovarian cancer? No / Unknown         10/7/2021   LAST FHS-7 RESULTS   1st degree relative breast or ovarian cancer No   Any relative bilateral breast cancer No   Any male have breast cancer No   Any ONE woman have BOTH breast AND ovarian cancer No   Any woman with breast cancer before 50yrs No   2 or more relatives with breast AND/OR ovarian cancer No            ASCVD Risk   The 10-year ASCVD risk score (Margareth HOYT, et al., 2019) is: 13.9%    Values used to calculate the score:      Age: 68 years      Sex: Female      Is Non- : No      Diabetic: Yes      Tobacco smoker: No      Systolic Blood Pressure: 116 mmHg      Is BP treated: Yes      HDL Cholesterol: 44 mg/dL      Total Cholesterol: 160 mg/dL            Reviewed and updated as needed this visit by Provider                    Past Medical History:   Diagnosis Date    Acute vaginitis     10/27/2010    Cardiomyopathy (H)     7/18/2014    Diverticulosis of large intestine without perforation or abscess without bleeding     No Comments  Provided    Female climacteric state     10/27/2010    Heart failure (H)     7/25/2014,With systolic dysfunction EF 20-25% by transthroacic echocardiogram 7/2014.    Nonrheumatic aortic valve disorder     last ultrasound was april 2005    Nonrheumatic aortic valve disorder     last ultrasound was april 2014    Nonrheumatic mitral valve insufficiency     7/18/2014,- echo April 2014: moderate MR    Other forms of dyspnea     7/18/2014    Other specified disorders of arteries and arterioles (CODE)     7/17/2014,- echo April 2014: Ascending aorta 4.6 cm    Other specified postprocedural states     7/21/2014,Repair Ascending Aortic Aneurysm with a 30 mm Gelweave Graft    Other specified postprocedural states     7/21/2014,Mitral Valve Ring Annuloplasty with a 32 mm Rigid Saddle Ring    Presence of prosthetic heart valve     7/21/2014,Aortic Valve Replacement with a 25 mm Magna Valve    Pure hypercholesterolemia     9/19/2013    Tachycardia     7/17/2014    Type 2 diabetes mellitus without complications (H)     10/27/2010    Ventricular septal defect     7/18/2014,- apparent hx of VSD that had spontaneous closure at age 17 when pregnant (no documentation)     Past Surgical History:   Procedure Laterality Date    COLONOSCOPY  09/12/2007 09/07,Follow up recommended in five years.    COLONOSCOPY  09/01/2016    normal, 10 year follow up    COLONOSCOPY  10/18/2012    LAPAROSCOPIC TUBAL LIGATION      No Comments Provided    OTHER SURGICAL HISTORY      7/14,23328.0,[place] REPLACE AORTIC VALVE OPEN W STENTLESS TISSUE VALVE,Pacemaker placed as well    TONSILLECTOMY, ADENOIDECTOMY, COMBINED       as a child     Current Outpatient Medications   Medication Sig Dispense Refill    aspirin 81 MG chewable tablet Take 81 mg by mouth daily with food      B-D ULTRA-FINE 33 LANCETS MISC 1 each by In Vitro route 2 times daily 200 each 6    bisoprolol (ZEBETA) 5 MG tablet Take 1 tablet by mouth twice daily 180 tablet 0    blood glucose (NO  BRAND SPECIFIED) test strip 2 times daily. Dispense item covered by pt ins. Type 2 diabetes mellitus without insulin 50 strip 0    blood glucose monitoring (NO BRAND SPECIFIED) meter device kit Use to test blood sugar 1 times daily or as directed. 1 kit 0    Blood Pressure Monitoring (RA BLOOD PRESSURE CUFF MONITOR) MISC       empagliflozin (JARDIANCE) 25 MG TABS tablet Take 1 tablet (25 mg) by mouth daily 90 tablet 4    glyBURIDE (DIABETA /MICRONASE) 5 MG tablet Take 1 tablet (5 mg) by mouth daily (with breakfast) 90 tablet 4    lisinopril (ZESTRIL) 10 MG tablet Take 1 tablet (10 mg) by mouth daily 90 tablet 3    simvastatin (ZOCOR) 10 MG tablet Take 1 tablet (10 mg) by mouth daily 90 tablet 4    spironolactone (ALDACTONE) 25 MG tablet Take 1 tablet (25 mg) by mouth daily 90 tablet 4     Allergies   Allergen Reactions    Azithromycin Rash     Recent Labs   Lab Test 02/08/23  0954 08/25/22  1546 10/14/21  0948 10/14/21  0948 08/17/20  1130 08/17/20  1129 05/10/19  0904   A1C 12.2*  --   --  7.8* 8.4*  --  7.6*   LDL 78  --   --  95  --  74 75   HDL 44*  --   --  47  --  42 47   TRIG 189*  --   --  144  --  119 109   ALT 17  --   --  15  --  16  --    CR 2.04* 2.15*   < > 1.88*  --  1.98* 1.82*   GFRESTIMATED 26* 25*   < > 28*  --  25* 28*   GFRESTBLACK  --   --   --   --   --  31* 34*   POTASSIUM 4.7  --   --  5.0  --  5.0 5.3*   TSH 2.63  --   --   --   --   --   --     < > = values in this interval not displayed.      Current providers sharing in care for this patient include:  Patient Care Team:  Ton Chaudhry MD as PCP - General (Family Practice)  Ton Chaudhry MD as Assigned PCP    The following health maintenance items are reviewed in Epic and correct as of today:  Health Maintenance   Topic Date Due    HF ACTION PLAN  Never done    EYE EXAM  Never done    LUNG CANCER SCREENING  Never done    RSV VACCINE (Pregnancy & 60+) (1 - 1-dose 60+ series) Never done    A1C  08/08/2023    BMP  08/08/2023     "MICROALBUMIN  08/08/2023    INFLUENZA VACCINE (1) 09/01/2023    COVID-19 Vaccine (5 - 2023-24 season) 09/01/2023    MEDICARE ANNUAL WELLNESS VISIT  02/08/2024    ALT  02/08/2024    LIPID  02/08/2024    DIABETIC FOOT EXAM  02/08/2024    CBC  02/08/2024    HEMOGLOBIN  02/08/2024    FALL RISK ASSESSMENT  04/01/2025    MAMMO SCREENING  06/02/2025    COLORECTAL CANCER SCREENING  09/01/2026    ADVANCE CARE PLANNING  02/09/2028    DTAP/TDAP/TD IMMUNIZATION (3 - Td or Tdap) 08/17/2030    DEXA  10/15/2036    PARATHYROID  Completed    PHOSPHORUS  Completed    TSH W/FREE T4 REFLEX  Completed    HEPATITIS C SCREENING  Completed    PHQ-2 (once per calendar year)  Completed    Pneumococcal Vaccine: 65+ Years  Completed    URINALYSIS  Completed    ALK PHOS  Completed    ZOSTER IMMUNIZATION  Completed    IPV IMMUNIZATION  Aged Out    HPV IMMUNIZATION  Aged Out    MENINGITIS IMMUNIZATION  Aged Out    RSV MONOCLONAL ANTIBODY  Aged Out    PAP  Discontinued            Objective    Exam  /80   Pulse 60   Temp 98.2  F (36.8  C) (Tympanic)   Resp 16   Wt 117.5 kg (259 lb)   SpO2 98%   BMI 40.57 kg/m     Estimated body mass index is 40.57 kg/m  as calculated from the following:    Height as of 10/14/21: 1.702 m (5' 7\").    Weight as of this encounter: 117.5 kg (259 lb).    Physical Exam  GENERAL: alert and no distress  EYES: Eyes grossly normal to inspection, PERRL and conjunctivae and sclerae normal  HENT: ear canals and TM's normal, nose and mouth without ulcers or lesions  NECK: no adenopathy, no asymmetry, masses, or scars  RESP: lungs clear to auscultation - no rales, rhonchi or wheezes  CV: regular rate and rhythm, normal S1 S2, no S3 or S4, stable 2/6 systolic ejection murmur, at left sternal border, no click or rub, no peripheral edema  ABDOMEN: soft, nontender, no hepatosplenomegaly, no masses and bowel sounds normal  MS: no gross musculoskeletal defects noted, no edema  SKIN: no suspicious lesions or rashes  NEURO: " Normal strength and tone, mentation intact and speech normal  PSYCH: mentation appears normal, affect normal/bright  Diabetic foot exam: normal DP and PT pulses, no trophic changes or ulcerative lesions, normal sensory exam, and normal monofilament exam        4/1/2024   Mini Cog   Clock Draw Score 2 Normal   3 Item Recall 3 objects recalled   Mini Cog Total Score 5         Results for orders placed or performed in visit on 04/01/24   HEMOGLOBIN A1C     Status: Abnormal   Result Value Ref Range    Hemoglobin A1C 10.6 (H) 4.0 - 6.2 %   BASIC METABOLIC PANEL     Status: Abnormal   Result Value Ref Range    Sodium 139 135 - 145 mmol/L    Potassium 4.8 3.4 - 5.3 mmol/L    Chloride 102 98 - 107 mmol/L    Carbon Dioxide (CO2) 27 22 - 29 mmol/L    Anion Gap 10 7 - 15 mmol/L    Urea Nitrogen 20.7 8.0 - 23.0 mg/dL    Creatinine 1.96 (H) 0.51 - 0.95 mg/dL    GFR Estimate 27 (L) >60 mL/min/1.73m2    Calcium 9.3 8.8 - 10.2 mg/dL    Glucose 236 (H) 70 - 99 mg/dL   Albumin Random Urine Quantitative with Creat Ratio     Status: Abnormal   Result Value Ref Range    Creatinine Urine mg/dL 134.9 mg/dL    Albumin Urine mg/L 75.2 mg/L    Albumin Urine mg/g Cr 55.74 (H) 0.00 - 25.00 mg/g Cr   ALT     Status: Normal   Result Value Ref Range    ALT 20 0 - 50 U/L   Lipid Profile     Status: Abnormal   Result Value Ref Range    Cholesterol 164 <200 mg/dL    Triglycerides 196 (H) <150 mg/dL    Direct Measure HDL 38 (L) >=50 mg/dL    LDL Cholesterol Calculated 87 <=100 mg/dL    Non HDL Cholesterol 126 <130 mg/dL    Patient Fasting > 8hrs? Yes     Narrative    Cholesterol  Desirable:  <200 mg/dL    Triglycerides  Normal:  Less than 150 mg/dL  Borderline High:  150-199 mg/dL  High:  200-499 mg/dL  Very High:  Greater than or equal to 500 mg/dL    Direct Measure HDL  Female:  Greater than or equal to 50 mg/dL   Male:  Greater than or equal to 40 mg/dL    LDL Cholesterol  Desirable:  <100mg/dL  Above Desirable:  100-129 mg/dL   Borderline High:   130-159 mg/dL   High:  160-189 mg/dL   Very High:  >= 190 mg/dL    Non HDL Cholesterol  Desirable:  130 mg/dL  Above Desirable:  130-159 mg/dL  Borderline High:  160-189 mg/dL  High:  190-219 mg/dL  Very High:  Greater than or equal to 220 mg/dL   CBC with Platelets     Status: Normal   Result Value Ref Range    WBC Count 5.0 4.0 - 11.0 10e3/uL    RBC Count 4.36 3.80 - 5.20 10e6/uL    Hemoglobin 12.7 11.7 - 15.7 g/dL    Hematocrit 40.3 35.0 - 47.0 %    MCV 92 78 - 100 fL    MCH 29.1 26.5 - 33.0 pg    MCHC 31.5 31.5 - 36.5 g/dL    RDW 12.7 10.0 - 15.0 %    Platelet Count 155 150 - 450 10e3/uL            Signed Electronically by: Ton Chaudhry MD

## 2024-04-01 NOTE — PATIENT INSTRUCTIONS
Preventive Care Advice   This is general advice given by our system to help you stay healthy. However, your care team may have specific advice just for you. Please talk to your care team about your preventive care needs.  Nutrition  Eat 5 or more servings of fruits and vegetables each day.  Try wheat bread, brown rice and whole grain pasta (instead of white bread, rice, and pasta).  Get enough calcium and vitamin D. Check the label on foods and aim for 100% of the RDA (recommended daily allowance).  Lifestyle  Exercise at least 150 minutes each week   (30 minutes a day, 5 days a week).  Do muscle strengthening activities 2 days a week. These help control your weight and prevent disease.  No smoking.  Wear sunscreen to prevent skin cancer.  Have a dental exam and cleaning every 6 months.  Yearly exams  See your health care team every year to talk about:  Any changes in your health.  Any medicines your care team has prescribed.  Preventive care, family planning, and ways to prevent chronic diseases.  Shots (vaccines)   HPV shots (up to age 26), if you've never had them before.  Hepatitis B shots (up to age 59), if you've never had them before.  COVID-19 shot: Get this shot when it's due.  Flu shot: Get a flu shot every year.  Tetanus shot: Get a tetanus shot every 10 years.  Pneumococcal, hepatitis A, and RSV shots: Ask your care team if you need these based on your risk.  Shingles shot (for age 50 and up).  General health tests  Diabetes screening:  Starting at age 35, Get screened for diabetes at least every 3 years.  If you are younger than age 35, ask your care team if you should be screened for diabetes.  Cholesterol test: At age 39, start having a cholesterol test every 5 years, or more often if advised.  Bone density scan (DEXA): At age 50, ask your care team if you should have this scan for osteoporosis (brittle bones).  Hepatitis C: Get tested at least once in your life.  STIs (sexually transmitted  infections)  Before age 24: Ask your care team if you should be screened for STIs.  After age 24: Get screened for STIs if you're at risk. You are at risk for STIs (including HIV) if:  You are sexually active with more than one person.  You don't use condoms every time.  You or a partner was diagnosed with a sexually transmitted infection.  If you are at risk for HIV, ask about PrEP medicine to prevent HIV.  Get tested for HIV at least once in your life, whether you are at risk for HIV or not.  Cancer screening tests  Cervical cancer screening: If you have a cervix, begin getting regular cervical cancer screening tests at age 21. Most people who have regular screenings with normal results can stop after age 65. Talk about this with your provider.  Breast cancer scan (mammogram): If you've ever had breasts, begin having regular mammograms starting at age 40. This is a scan to check for breast cancer.  Colon cancer screening: It is important to start screening for colon cancer at age 45.  Have a colonoscopy test every 10 years (or more often if you're at risk) Or, ask your provider about stool tests like a FIT test every year or Cologuard test every 3 years.  To learn more about your testing options, visit: https://www.[email or web address]/744795.pdf.  For help making a decision, visit: https://bit.ly/fs75405.  Prostate cancer screening test: If you have a prostate and are age 55 to 69, ask your provider if you would benefit from a yearly prostate cancer screening test.  Lung cancer screening: If you are a current or former smoker age 50 to 80, ask your care team if ongoing lung cancer screenings are right for you.  For informational purposes only. Not to replace the advice of your health care provider. Copyright   2023 [employer]. All rights reserved. Clinically reviewed by the [facility] Transitions Program. [employer] 318578 - REV 01/24.

## 2024-04-01 NOTE — NURSING NOTE
"Chief Complaint   Patient presents with    Diabetes    Medicare Visit       Initial /80   Pulse 60   Temp 98.2  F (36.8  C) (Tympanic)   Resp 16   Wt 117.5 kg (259 lb)   SpO2 98%   BMI 40.57 kg/m   Estimated body mass index is 40.57 kg/m  as calculated from the following:    Height as of 10/14/21: 1.702 m (5' 7\").    Weight as of this encounter: 117.5 kg (259 lb).  Medication Reconciliation: complete        "

## 2024-06-18 ENCOUNTER — HOSPITAL ENCOUNTER (OUTPATIENT)
Dept: MAMMOGRAPHY | Facility: OTHER | Age: 69
Discharge: HOME | End: 2024-06-18
Attending: FAMILY MEDICINE | Admitting: FAMILY MEDICINE
Payer: MEDICARE

## 2024-06-18 DIAGNOSIS — Z12.31 VISIT FOR SCREENING MAMMOGRAM: ICD-10-CM

## 2024-06-18 PROCEDURE — 77063 BREAST TOMOSYNTHESIS BI: CPT

## 2024-07-01 ENCOUNTER — OFFICE VISIT (OUTPATIENT)
Dept: FAMILY MEDICINE | Facility: OTHER | Age: 69
End: 2024-07-01
Attending: FAMILY MEDICINE
Payer: MEDICARE

## 2024-07-01 VITALS
WEIGHT: 254.6 LBS | TEMPERATURE: 97.7 F | SYSTOLIC BLOOD PRESSURE: 110 MMHG | DIASTOLIC BLOOD PRESSURE: 72 MMHG | OXYGEN SATURATION: 100 % | BODY MASS INDEX: 39.88 KG/M2 | RESPIRATION RATE: 16 BRPM | HEART RATE: 78 BPM

## 2024-07-01 DIAGNOSIS — E11.9 TYPE 2 DIABETES MELLITUS WITHOUT COMPLICATION, WITHOUT LONG-TERM CURRENT USE OF INSULIN (H): ICD-10-CM

## 2024-07-01 DIAGNOSIS — N18.32 TYPE 2 DIABETES MELLITUS WITH STAGE 3B CHRONIC KIDNEY DISEASE, WITHOUT LONG-TERM CURRENT USE OF INSULIN (H): Primary | ICD-10-CM

## 2024-07-01 DIAGNOSIS — E11.22 TYPE 2 DIABETES MELLITUS WITH STAGE 3B CHRONIC KIDNEY DISEASE, WITHOUT LONG-TERM CURRENT USE OF INSULIN (H): Primary | ICD-10-CM

## 2024-07-01 LAB
CREAT UR-MCNC: 95.7 MG/DL
ERYTHROCYTE [DISTWIDTH] IN BLOOD BY AUTOMATED COUNT: 12.5 % (ref 10–15)
HBA1C MFR BLD: 9.1 % (ref 4–6.2)
HCT VFR BLD AUTO: 40.1 % (ref 35–47)
HGB BLD-MCNC: 12.7 G/DL (ref 11.7–15.7)
HOLD SPECIMEN: NORMAL
MCH RBC QN AUTO: 29.1 PG (ref 26.5–33)
MCHC RBC AUTO-ENTMCNC: 31.7 G/DL (ref 31.5–36.5)
MCV RBC AUTO: 92 FL (ref 78–100)
MICROALBUMIN UR-MCNC: <12 MG/L
MICROALBUMIN/CREAT UR: NORMAL MG/G{CREAT}
PLATELET # BLD AUTO: 146 10E3/UL (ref 150–450)
RBC # BLD AUTO: 4.37 10E6/UL (ref 3.8–5.2)
WBC # BLD AUTO: 5.3 10E3/UL (ref 4–11)

## 2024-07-01 PROCEDURE — 85027 COMPLETE CBC AUTOMATED: CPT | Mod: ZL | Performed by: FAMILY MEDICINE

## 2024-07-01 PROCEDURE — G2211 COMPLEX E/M VISIT ADD ON: HCPCS | Performed by: FAMILY MEDICINE

## 2024-07-01 PROCEDURE — 82043 UR ALBUMIN QUANTITATIVE: CPT | Mod: ZL | Performed by: FAMILY MEDICINE

## 2024-07-01 PROCEDURE — 36415 COLL VENOUS BLD VENIPUNCTURE: CPT | Mod: ZL | Performed by: FAMILY MEDICINE

## 2024-07-01 PROCEDURE — 83036 HEMOGLOBIN GLYCOSYLATED A1C: CPT | Mod: ZL | Performed by: FAMILY MEDICINE

## 2024-07-01 PROCEDURE — G0463 HOSPITAL OUTPT CLINIC VISIT: HCPCS

## 2024-07-01 PROCEDURE — 99213 OFFICE O/P EST LOW 20 MIN: CPT | Performed by: FAMILY MEDICINE

## 2024-07-01 RX ORDER — LIRAGLUTIDE 6 MG/ML
1.2 INJECTION SUBCUTANEOUS DAILY
Qty: 15 ML | Refills: 4 | Status: SHIPPED | OUTPATIENT
Start: 2024-07-01 | End: 2024-07-05

## 2024-07-01 ASSESSMENT — PAIN SCALES - GENERAL: PAINLEVEL: NO PAIN (0)

## 2024-07-01 NOTE — PROGRESS NOTES
Assessment & Plan     (E11.22,  N18.32) Type 2 diabetes mellitus with stage 3b chronic kidney disease, without long-term current use of insulin (H)  (primary encounter diagnosis)  Comment: there are some barriers with compliance, notably supply of one of the meds. A1c is trending down nicely and I would expect the 3 month follow up to be even better. She is looking into moving her meds to mail order. If this changes, can send us a message.   Plan: empagliflozin (JARDIANCE) 25 MG TABS tablet,         Hemoglobin A1c             (E11.9) Type 2 diabetes mellitus without complication, without long-term current use of insulin (H)  Comment:    Plan: blood glucose (NO BRAND SPECIFIED) test strip,         liraglutide (VICTOZA) 18 MG/3ML solution        Refilled this without change    The longitudinal plan of care for the diagnosis(es)/condition(s) as documented were addressed during this visit. Due to the added complexity in care, I will continue to support Glenys in the subsequent management and with ongoing continuity of care.      Blood sugar testing frequency justification:  Uncontrolled diabetes and Adjustment of medication(s)        No follow-ups on file.    Josh Veronica is a 68 year old, presenting for the following health issues:  Diabetes      7/1/2024     8:23 AM   Additional Questions   Roomed by ARON Rai   Accompanied by Self         7/1/2024     8:23 AM   Patient Reported Additional Medications   Patient reports taking the following new medications N/A     History of Present Illness       Diabetes:   She presents for follow up of diabetes.  She is checking home blood glucose two times daily.   She checks blood glucose before and after meals.  Blood glucose is sometimes over 200 and never under 70.  When her blood glucose is low, the patient is asymptomatic for confusion, blurred vision, lethargy and reports not feeling dizzy, shaky, or weak.   She has no concerns regarding her diabetes at this  time.  She is having numbness in feet and burning in feet.  The patient has not had a diabetic eye exam in the last 12 months.            At our last visit, added on Victoza. Sugars are a little lower but still over 200 often. Her pharmacy ran out of Jardiance, so is off it for at least the last 4 weeks. Is now looking into mail order.  Has lost about 5#, notes appetite is reduced.     Is getting her pacer replaced, on July 17th.     Recent Labs   Lab Test 04/01/24  1004 02/08/23  0954 08/25/22  1546 10/14/21  0948 08/17/20  1130 08/17/20  1129 05/10/19  0904   A1C 10.6* 12.2*  --  7.8*   < >  --  7.6*   LDL 87 78  --  95  --  74 75   HDL 38* 44*  --  47  --  42 47   TRIG 196* 189*  --  144  --  119 109   ALT 20 17  --  15  --  16  --    CR 1.96* 2.04*   < > 1.88*  --  1.98* 1.82*   GFRESTIMATED 27* 26*   < > 28*  --  25* 28*   GFRESTBLACK  --   --   --   --   --  31* 34*   POTASSIUM 4.8 4.7  --  5.0  --  5.0 5.3*   TSH  --  2.63  --   --   --   --   --     < > = values in this interval not displayed.                          Objective    /72   Pulse 78   Temp 97.7  F (36.5  C) (Tympanic)   Resp 16   Wt 115.5 kg (254 lb 9.6 oz)   SpO2 100%   BMI 39.88 kg/m    Body mass index is 39.88 kg/m .  Physical Exam   GENERAL: alert and no distress  RESP: lungs clear to auscultation - no rales, rhonchi or wheezes  CV: regular rate and rhythm, normal S1 S2, no S3 or S4, stale 2/6 systolic ejection murmur over right sternal border, click or rub, no peripheral edema   MS: no gross musculoskeletal defects noted, no edema  PSYCH: mentation appears normal, affect normal/bright    Results for orders placed or performed in visit on 07/01/24   Hemoglobin A1c     Status: Abnormal   Result Value Ref Range    Hemoglobin A1C 9.1 (H) 4.0 - 6.2 %   CBC W PLT No Diff     Status: Abnormal   Result Value Ref Range    WBC Count 5.3 4.0 - 11.0 10e3/uL    RBC Count 4.37 3.80 - 5.20 10e6/uL    Hemoglobin 12.7 11.7 - 15.7 g/dL     Hematocrit 40.1 35.0 - 47.0 %    MCV 92 78 - 100 fL    MCH 29.1 26.5 - 33.0 pg    MCHC 31.7 31.5 - 36.5 g/dL    RDW 12.5 10.0 - 15.0 %    Platelet Count 146 (L) 150 - 450 10e3/uL   Albumin Random Urine Quantitative with Creat Ratio     Status: None   Result Value Ref Range    Creatinine Urine mg/dL 95.7 mg/dL    Albumin Urine mg/L <12.0 mg/L    Albumin Urine mg/g Cr     Extra Tube     Status: None    Narrative    The following orders were created for panel order Extra Tube.  Procedure                               Abnormality         Status                     ---------                               -----------         ------                     Extra Green Top (Lithium...[[id number]]                      Final result                 Please view results for these tests on the individual orders.   Extra Green Top (Lithium Heparin) Tube     Status: None   Result Value Ref Range    Hold Specimen JIC              Signed Electronically by: Ton Chaudhry MD

## 2024-07-01 NOTE — NURSING NOTE
"Chief Complaint   Patient presents with    Diabetes       Initial /72   Pulse 78   Temp 97.7  F (36.5  C) (Tympanic)   Resp 16   Wt 115.5 kg (254 lb 9.6 oz)   SpO2 100%   BMI 39.88 kg/m   Estimated body mass index is 39.88 kg/m  as calculated from the following:    Height as of 10/14/21: 1.702 m (5' 7\").    Weight as of this encounter: 115.5 kg (254 lb 9.6 oz).  Medication Reconciliation: complete          "

## 2024-07-02 ENCOUNTER — TELEPHONE (OUTPATIENT)
Dept: FAMILY MEDICINE | Facility: OTHER | Age: 69
End: 2024-07-02
Payer: MEDICARE

## 2024-07-02 DIAGNOSIS — E11.22 TYPE 2 DIABETES MELLITUS WITH STAGE 3B CHRONIC KIDNEY DISEASE, WITHOUT LONG-TERM CURRENT USE OF INSULIN (H): ICD-10-CM

## 2024-07-02 DIAGNOSIS — N18.32 TYPE 2 DIABETES MELLITUS WITH STAGE 3B CHRONIC KIDNEY DISEASE, WITHOUT LONG-TERM CURRENT USE OF INSULIN (H): ICD-10-CM

## 2024-07-02 DIAGNOSIS — E11.9 TYPE 2 DIABETES MELLITUS WITHOUT COMPLICATION, WITHOUT LONG-TERM CURRENT USE OF INSULIN (H): ICD-10-CM

## 2024-07-02 NOTE — TELEPHONE ENCOUNTER
Medical Necessity Form for High Utilization received from [facility], regarding diabetic supplies. Chart accessed to gather information needed to fill out form. Form faxed to Dr. Chaudhry's physical inbasket. Haylie Priest RN .............. 7/2/2024  8:40 AM

## 2024-07-05 ENCOUNTER — MEDICAL CORRESPONDENCE (OUTPATIENT)
Dept: HEALTH INFORMATION MANAGEMENT | Facility: OTHER | Age: 69
End: 2024-07-05
Payer: MEDICARE

## 2024-07-08 ENCOUNTER — TELEPHONE (OUTPATIENT)
Dept: FAMILY MEDICINE | Facility: OTHER | Age: 69
End: 2024-07-08
Payer: MEDICARE

## 2024-07-08 DIAGNOSIS — N18.32 TYPE 2 DIABETES MELLITUS WITH STAGE 3B CHRONIC KIDNEY DISEASE, WITHOUT LONG-TERM CURRENT USE OF INSULIN (H): ICD-10-CM

## 2024-07-08 DIAGNOSIS — E11.22 TYPE 2 DIABETES MELLITUS WITH STAGE 3B CHRONIC KIDNEY DISEASE, WITHOUT LONG-TERM CURRENT USE OF INSULIN (H): ICD-10-CM

## 2024-07-08 DIAGNOSIS — E11.9 TYPE 2 DIABETES MELLITUS WITHOUT COMPLICATION, WITHOUT LONG-TERM CURRENT USE OF INSULIN (H): ICD-10-CM

## 2024-07-08 NOTE — TELEPHONE ENCOUNTER
Heidy from  [facility] mail order pharmacy is requesting refills for the following medications:  Jardiance  Glyburide  Victoza  Pen needles    Please advise.    Li Asher on 7/8/2024 at 3:43 PM

## 2024-07-09 RX ORDER — LIRAGLUTIDE 6 MG/ML
1.2 INJECTION SUBCUTANEOUS DAILY
Qty: 15 ML | Refills: 4 | Status: SHIPPED | OUTPATIENT
Start: 2024-07-09

## 2024-07-09 RX ORDER — GLYBURIDE 5 MG/1
5 TABLET ORAL
Qty: 90 TABLET | Refills: 4 | Status: SHIPPED | OUTPATIENT
Start: 2024-07-09

## 2024-07-09 NOTE — TELEPHONE ENCOUNTER
[facility] sent Rx request for the following:    empagliflozin (JARDIANCE) 25 MG TABS tablet 90 tablet 4 7/1/2024 -- No   Sig - Route: Take 1 tablet (25 mg) by mouth daily - Oral   Sent to pharmacy as: Empagliflozin 25 MG Oral Tablet (Jardiance)   Class: E-Prescribe   Order: [id number]   E-Prescribing Status: Receipt confirmed by pharmacy (7/1/2024  8:43 AM CDT)     insulin pen needle (32G X 6 MM) 32G X 6 MM miscellaneous 100 each 4 4/1/2024 -- No   Sig: Use 1 pen needles daily or as directed.   Sent to pharmacy as: Insulin Pen Needle 32G X 6 MM (32G X 6 MM)   Class: E-Prescribe   Order: [id number]   E-Prescribing Status: Receipt confirmed by pharmacy (4/1/2024 12:22 PM CDT)     liraglutide (VICTOZA) 18 MG/3ML solution 15 mL 4 7/1/2024 -- No   Sig - Route: Inject 1.2 mg Subcutaneous daily - Subcutaneous   Sent to pharmacy as: Liraglutide 18 MG/3ML Subcutaneous Solution Pen-injector (VICTOZA)   Class: E-Prescribe   Order: [id number]   E-Prescribing Status: Receipt confirmed by pharmacy (7/1/2024  8:43 AM CDT)     glyBURIDE (DIABETA /MICRONASE) 5 MG tablet 90 tablet 4 4/1/2024 -- No   Sig - Route: Take 1 tablet (5 mg) by mouth daily (with breakfast) - Oral   Sent to pharmacy as: glyBURIDE 5 MG Oral Tablet (DIABETA /MICRONASE)   Class: E-Prescribe   Order: [id number]   E-Prescribing Status: Receipt confirmed by pharmacy (4/1/2024  9:41 AM CDT)     [facility] - [address] prescriptions sent to mail order pharmacy, per above written orders. Haylie Priest RN .............. 7/9/2024  11:08 AM

## 2024-07-10 RX ORDER — LIRAGLUTIDE 6 MG/ML
1.2 INJECTION SUBCUTANEOUS DAILY
Qty: 15 ML | Refills: 4 | Status: SHIPPED | OUTPATIENT
Start: 2024-07-10

## 2024-07-10 RX ORDER — GLYBURIDE 5 MG/1
5 TABLET ORAL
Qty: 90 TABLET | Refills: 4 | Status: SHIPPED | OUTPATIENT
Start: 2024-07-10

## 2024-07-22 ENCOUNTER — TELEPHONE (OUTPATIENT)
Dept: FAMILY MEDICINE | Facility: OTHER | Age: 69
End: 2024-07-22
Payer: COMMERCIAL

## 2024-07-22 NOTE — TELEPHONE ENCOUNTER
Medical Necessity Form for High Utilization received from [facility]. Chart accessed to gather information needed to fill out form. Form routed to Dr. Chaudhry's physical in[place]anne[place]. Haylie Priest RN .............. 7/22/2024  4:08 PM

## 2024-08-26 ENCOUNTER — MEDICAL CORRESPONDENCE (OUTPATIENT)
Dept: HEALTH INFORMATION MANAGEMENT | Facility: OTHER | Age: 69
End: 2024-08-26
Payer: MEDICARE

## 2024-09-26 NOTE — LETTER
[facility]  [address]  [place] [id number] [phone]       August 15, 2023    Glenys Ladd  [address] [place] [address]    Dear Glenys,    We care about your health and have reviewed your health plan and are making recommendations based on this review, to optimize your health.    You are in particular need of attention regarding:  -Diabetes    We are recommending that you:  -schedule a FOLLOWUP OFFICE APPOINTMENT with me.      Also, if you are smoking still and would like some help, then please contact us or make an appointment to discuss your options ([email or web address] has very good free information.)                                                            In addition, here is a list of due or overdue Health Maintenance reminders.    Health Maintenance Due   Topic Date Due    Heart Failure Action Plan  Never done    Eye Exam  Never done    LUNG CANCER SCREENING  Never done    COVID-19 Vaccine (5 - Pfizer series) 06/08/2023    A1C Lab  08/08/2023    Basic Metabolic Panel  08/08/2023    Kidney Microalbumin Urine Test  08/08/2023       To address the above recommendations, we encourage you to contact us at [phone]. They will assist you with finding the most convenient time and location.    Thank you for trusting [facility] and we appreciate the opportunity to serve you.  We look forward to supporting your healthcare needs in the future.    Healthy Regards,    Your [facility] Team   09/26/24 Transition of care:  Pt admitted OBS from ED for stroke like symptoms MRI, PT/OT pending Met with pt to discuss transition of care and discharge preferences Pt lives with her  in a ranch home with no handrail Pt is independent with ADLs and uses no AD Pt has had HHC with [facility] but was recently d/c due to no need PCP is Brad RX is [facility] Plan is home with no needs expressed PT/OT is signed up for pt  will transport at discharge CM/SW to follow Electronically signed by Rubio Castro RN on 9/26/2024 at 8:25 AM     10:48am Pt can not have MRI here due to reaction to ativan Pt scheduled for open MRI at [facility] tomorrow 09/27 at 11am [facility] called for transport they will be here at 10am tomorrow Electronically signed by Rubio Castro RN on 9/26/2024 at 10:49 AM     Case Management Assessment  Initial Evaluation    Date/Time of Evaluation: 9/26/2024 8:25 AM  Assessment Completed by: Rubio Castro RN    If patient is discharged prior to next notation, then this note serves as note for discharge by case management.    Patient Name: Quyen Quinones                   YOB: 1963  Diagnosis: Stroke-like symptoms [R29.90]  Cerebrovascular accident (CVA), unspecified mechanism (HCC) [I63.9]                   Date / Time: 9/25/2024  4:43 PM    Patient Admission Status: Observation   Readmission Risk (Low < 19, Mod (19-27), High > 27): Readmission Risk Score: 7.7    Current PCP: Kendell Salinas, DO  PCP verified by ? Yes    Chart Reviewed: Yes      History Provided by: Patient  Patient Orientation: Alert and Oriented    Patient Cognition: Alert    Hospitalization in the last 30 days (Readmission):  No    If yes, Readmission Assessment in  Navigator will be completed.    Advance Directives:      Code Status: Full Code   Patient's Primary Decision Maker is: Legal Next of Kin      Discharge Planning:    Patient lives with: Spouse/Significant Other Type of Home:

## 2024-11-01 ENCOUNTER — OFFICE VISIT (OUTPATIENT)
Dept: FAMILY MEDICINE | Facility: OTHER | Age: 69
End: 2024-11-01
Attending: FAMILY MEDICINE
Payer: MEDICARE

## 2024-11-01 VITALS
WEIGHT: 252.6 LBS | HEIGHT: 67 IN | BODY MASS INDEX: 39.65 KG/M2 | HEART RATE: 71 BPM | TEMPERATURE: 97.5 F | OXYGEN SATURATION: 96 % | RESPIRATION RATE: 16 BRPM | SYSTOLIC BLOOD PRESSURE: 130 MMHG | DIASTOLIC BLOOD PRESSURE: 73 MMHG

## 2024-11-01 DIAGNOSIS — N18.32 TYPE 2 DIABETES MELLITUS WITH STAGE 3B CHRONIC KIDNEY DISEASE, WITHOUT LONG-TERM CURRENT USE OF INSULIN (H): Primary | ICD-10-CM

## 2024-11-01 DIAGNOSIS — Z23 NEED FOR COVID-19 VACCINE: ICD-10-CM

## 2024-11-01 DIAGNOSIS — Z23 NEED FOR INFLUENZA VACCINATION: ICD-10-CM

## 2024-11-01 DIAGNOSIS — E11.22 TYPE 2 DIABETES MELLITUS WITH STAGE 3B CHRONIC KIDNEY DISEASE, WITHOUT LONG-TERM CURRENT USE OF INSULIN (H): Primary | ICD-10-CM

## 2024-11-01 DIAGNOSIS — N18.4 CHRONIC KIDNEY DISEASE, STAGE 4 (SEVERE) (H): ICD-10-CM

## 2024-11-01 LAB
ANION GAP SERPL CALCULATED.3IONS-SCNC: 9 MMOL/L (ref 7–15)
BUN SERPL-MCNC: 31.2 MG/DL (ref 8–23)
CALCIUM SERPL-MCNC: 9.4 MG/DL (ref 8.8–10.4)
CHLORIDE SERPL-SCNC: 105 MMOL/L (ref 98–107)
CREAT SERPL-MCNC: 2.72 MG/DL (ref 0.51–0.95)
CREAT UR-MCNC: 102.7 MG/DL
EGFRCR SERPLBLD CKD-EPI 2021: 18 ML/MIN/1.73M2
ERYTHROCYTE [DISTWIDTH] IN BLOOD BY AUTOMATED COUNT: 12.8 % (ref 10–15)
EST. AVERAGE GLUCOSE BLD GHB EST-MCNC: 192 MG/DL
GLUCOSE SERPL-MCNC: 224 MG/DL (ref 70–99)
HBA1C MFR BLD: 8.3 %
HCO3 SERPL-SCNC: 23 MMOL/L (ref 22–29)
HCT VFR BLD AUTO: 40.5 % (ref 35–47)
HGB BLD-MCNC: 12.7 G/DL (ref 11.7–15.7)
MCH RBC QN AUTO: 30 PG (ref 26.5–33)
MCHC RBC AUTO-ENTMCNC: 31.4 G/DL (ref 31.5–36.5)
MCV RBC AUTO: 96 FL (ref 78–100)
MICROALBUMIN UR-MCNC: <12 MG/L
MICROALBUMIN/CREAT UR: NORMAL MG/G{CREAT}
PLATELET # BLD AUTO: 150 10E3/UL (ref 150–450)
POTASSIUM SERPL-SCNC: 5.4 MMOL/L (ref 3.4–5.3)
RBC # BLD AUTO: 4.24 10E6/UL (ref 3.8–5.2)
SODIUM SERPL-SCNC: 137 MMOL/L (ref 135–145)
WBC # BLD AUTO: 4.7 10E3/UL (ref 4–11)

## 2024-11-01 PROCEDURE — 99214 OFFICE O/P EST MOD 30 MIN: CPT | Performed by: FAMILY MEDICINE

## 2024-11-01 PROCEDURE — G0008 ADMIN INFLUENZA VIRUS VAC: HCPCS

## 2024-11-01 PROCEDURE — G0463 HOSPITAL OUTPT CLINIC VISIT: HCPCS | Mod: 25

## 2024-11-01 PROCEDURE — 90480 ADMN SARSCOV2 VAC 1/ONLY CMP: CPT

## 2024-11-01 PROCEDURE — G2211 COMPLEX E/M VISIT ADD ON: HCPCS | Performed by: FAMILY MEDICINE

## 2024-11-01 PROCEDURE — 85027 COMPLETE CBC AUTOMATED: CPT | Mod: ZL | Performed by: FAMILY MEDICINE

## 2024-11-01 PROCEDURE — 91320 SARSCV2 VAC 30MCG TRS-SUC IM: CPT

## 2024-11-01 PROCEDURE — 36415 COLL VENOUS BLD VENIPUNCTURE: CPT | Mod: ZL | Performed by: FAMILY MEDICINE

## 2024-11-01 PROCEDURE — 82043 UR ALBUMIN QUANTITATIVE: CPT | Mod: ZL | Performed by: FAMILY MEDICINE

## 2024-11-01 PROCEDURE — 83036 HEMOGLOBIN GLYCOSYLATED A1C: CPT | Mod: ZL | Performed by: FAMILY MEDICINE

## 2024-11-01 PROCEDURE — 80048 BASIC METABOLIC PNL TOTAL CA: CPT | Mod: ZL | Performed by: FAMILY MEDICINE

## 2024-11-01 ASSESSMENT — PAIN SCALES - GENERAL: PAINLEVEL_OUTOF10: EXTREME PAIN (8)

## 2024-11-01 NOTE — PROGRESS NOTES
"  Assessment & Plan     (E11.22,  N18.32) Type 2 diabetes mellitus with stage 3b chronic kidney disease, without long-term current use of insulin (H)  (primary encounter diagnosis)  Comment: diabetes is improving nicely. Encouraged diet and more exercise. Discussed with her starting insulin at HS, but at current rate of change, shouldbe able to hold off for a little while. CKD is now stage 4 not stage 3b  Plan: Hemoglobin A1c, CBC with platelets, Albumin         Random Urine Quantitative with Creat Ratio,         Basic Metabolic Panel             (Z23) Need for COVID-19 vaccine  Comment:    Plan: COVID-19 12+ (PFIZER)             (Z23) Need for influenza vaccination  Comment:    Plan: INFLUENZA HIGH DOSE, TRIVALENT, PF (FLUZONE)             (N18.4) Chronic kidney disease, stage 4 (severe) (H)  Comment: progressing, albeit slowly. Consult has been placed with jose. She will call them again to arrange a more urgent visit, even if it is in [place]. Will get a new renal US. Mild hyperkalemia might be related to this.   Plan: US Renal Complete Non-Vascular                   BMI  Estimated body mass index is 39.56 kg/m  as calculated from the following:    Height as of this encounter: 1.702 m (5' 7\").    Weight as of this encounter: 114.6 kg (252 lb 9.6 oz).             Return in about 3 months (around 2/1/2025) for Follow up.    The longitudinal plan of care for the diagnosis(es)/condition(s) as documented were addressed during this visit. Due to the added complexity in care, I will continue to support Glenys in the subsequent management and with ongoing continuity of care.    Josh Veronica is a 69 year old, presenting for the following health issues:  A1c      11/1/2024     8:27 AM   Additional Questions   Roomed by Carlota MEADOWS     History of Present Illness       Diabetes:   She presents for follow up of diabetes.  She is checking home blood glucose one time daily.   She checks blood glucose before and after " meals.  Blood glucose is sometimes over 200 and never under 70.  When her blood glucose is low, the patient is asymptomatic for confusion, blurred vision, lethargy and reports not feeling dizzy, shaky, or weak.   She has no concerns regarding her diabetes at this time.   She is not experiencing numbness or burning in feet, excessive thirst, blurry vision, weight changes or redness, sores or blisters on feet. The patient has not had a diabetic eye exam in the last 12 months.          She eats 2-3 servings of fruits and vegetables daily.She consumes 0 sweetened beverage(s) daily.She exercises with enough effort to increase her heart rate 30 to 60 minutes per day.  She exercises with enough effort to increase her heart rate 4 days per week.   She is taking medications regularly.     She has been referred to nephrology, and has called there a few times but has not been scheduled yet. Last renal US was in 2020, was normal.    Is reducing her sugar intake intentionally now too. Enjoys veggies especially. No chest pain or  shortness of breath. Has lost another few pounds since last summer. Had an eye check scheduled, but her optometrist moved. Likes the victoza, working well.     Current Outpatient Medications   Medication Sig Dispense Refill    aspirin 81 MG chewable tablet Take 81 mg by mouth daily with food      B-D ULTRA-FINE 33 LANCETS MISC 1 each by In Vitro route 2 times daily 200 each 6    bisoprolol (ZEBETA) 5 MG tablet Take 1 tablet by mouth twice daily 180 tablet 0    blood glucose (NO BRAND SPECIFIED) test strip 2 times daily. Dispense item covered by pt ins. Type 2 diabetes mellitus without insulin 200 strip 4    blood glucose monitoring (NO BRAND SPECIFIED) meter device kit Use to test blood sugar 1 times daily or as directed. 1 kit 0    empagliflozin (JARDIANCE) 25 MG TABS tablet Take 1 tablet (25 mg) by mouth daily 90 tablet 4    empagliflozin (JARDIANCE) 25 MG TABS tablet Take 1 tablet (25 mg) by mouth  "daily 90 tablet 4    glyBURIDE (DIABETA /MICRONASE) 5 MG tablet Take 1 tablet (5 mg) by mouth daily (with breakfast) 90 tablet 4    glyBURIDE (DIABETA /MICRONASE) 5 MG tablet Take 1 tablet (5 mg) by mouth daily (with breakfast) 90 tablet 4    insulin pen needle (32G X 6 MM) 32G X 6 MM miscellaneous Use 1 pen needles daily or as directed. 100 each 4    insulin pen needle (32G X 6 MM) 32G X 6 MM miscellaneous Use 1 pen needles daily or as directed. 100 each 3    liraglutide (VICTOZA) 18 MG/3ML solution Inject 1.2 mg subcutaneously daily 15 mL 4    liraglutide (VICTOZA) 18 MG/3ML solution Inject 1.2 mg Subcutaneous daily 15 mL 4    lisinopril (ZESTRIL) 10 MG tablet Take 1 tablet (10 mg) by mouth daily 90 tablet 4    simvastatin (ZOCOR) 10 MG tablet Take 1 tablet (10 mg) by mouth daily 90 tablet 4    spironolactone (ALDACTONE) 25 MG tablet Take 1 tablet (25 mg) by mouth daily 90 tablet 4    Blood Pressure Monitoring (RA BLOOD PRESSURE CUFF MONITOR) MISC        No current facility-administered medications for this visit.     Recent Labs   Lab Test 07/01/24  0853 04/01/24  1004 02/08/23  0954 08/25/22  1546 10/14/21  0948 08/17/20  1130 08/17/20  1129 05/10/19  0904   A1C 9.1* 10.6* 12.2*  --  7.8*   < >  --  7.6*   LDL  --  87 78  --  95  --  74 75   HDL  --  38* 44*  --  47  --  42 47   TRIG  --  196* 189*  --  144  --  119 109   ALT  --  20 17  --  15  --  16  --    CR  --  1.96* 2.04*   < > 1.88*  --  1.98* 1.82*   GFRESTIMATED  --  27* 26*   < > 28*  --  25* 28*   GFRESTBLACK  --   --   --   --   --   --  31* 34*   POTASSIUM  --  4.8 4.7  --  5.0  --  5.0 5.3*   TSH  --   --  2.63  --   --   --   --   --     < > = values in this interval not displayed.                          Objective    /73 (BP Location: Right arm, Patient Position: Sitting, Cuff Size: Adult Large)   Pulse 71   Temp 97.5  F (36.4  C) (Tympanic)   Resp 16   Ht 1.702 m (5' 7\")   Wt 114.6 kg (252 lb 9.6 oz)   SpO2 96%   BMI 39.56 kg/m  "   Body mass index is 39.56 kg/m .  Physical Exam   GENERAL: alert and no distress  RESP: lungs clear to auscultation - no rales, rhonchi or wheezes  CV: regular rate and rhythm, normal S1 S2, no S3 or S4, no murmur, click or rub, no peripheral edema   SKIN: no suspicious lesions or rashes  PSYCH: mentation appears normal, affect normal/bright    Results for orders placed or performed in visit on 11/01/24   Hemoglobin A1c     Status: Abnormal   Result Value Ref Range    Estimated Average Glucose 192 (H) <117 mg/dL    Hemoglobin A1C 8.3 (H) <5.7 %   CBC with platelets     Status: Abnormal   Result Value Ref Range    WBC Count 4.7 4.0 - 11.0 10e3/uL    RBC Count 4.24 3.80 - 5.20 10e6/uL    Hemoglobin 12.7 11.7 - 15.7 g/dL    Hematocrit 40.5 35.0 - 47.0 %    MCV 96 78 - 100 fL    MCH 30.0 26.5 - 33.0 pg    MCHC 31.4 (L) 31.5 - 36.5 g/dL    RDW 12.8 10.0 - 15.0 %    Platelet Count 150 150 - 450 10e3/uL   Albumin Random Urine Quantitative with Creat Ratio     Status: None   Result Value Ref Range    Creatinine Urine mg/dL 102.7 mg/dL    Albumin Urine mg/L <12.0 mg/L    Albumin Urine mg/g Cr     Basic Metabolic Panel     Status: Abnormal   Result Value Ref Range    Sodium 137 135 - 145 mmol/L    Potassium 5.4 (H) 3.4 - 5.3 mmol/L    Chloride 105 98 - 107 mmol/L    Carbon Dioxide (CO2) 23 22 - 29 mmol/L    Anion Gap 9 7 - 15 mmol/L    Urea Nitrogen 31.2 (H) 8.0 - 23.0 mg/dL    Creatinine 2.72 (H) 0.51 - 0.95 mg/dL    GFR Estimate 18 (L) >60 mL/min/1.73m2    Calcium 9.4 8.8 - 10.4 mg/dL    Glucose 224 (H) 70 - 99 mg/dL             Signed Electronically by: Ton Chaudhry MD

## 2024-11-01 NOTE — NURSING NOTE
"Chief Complaint   Patient presents with    A1c       Initial /73 (BP Location: Right arm, Patient Position: Sitting, Cuff Size: Adult Large)   Pulse 71   Temp 97.5  F (36.4  C) (Tympanic)   Resp 16   Ht 1.702 m (5' 7\")   Wt 114.6 kg (252 lb 9.6 oz)   SpO2 96%   BMI 39.56 kg/m   Estimated body mass index is 39.56 kg/m  as calculated from the following:    Height as of this encounter: 1.702 m (5' 7\").    Weight as of this encounter: 114.6 kg (252 lb 9.6 oz).  Medication Review: complete    The next two questions are to help us understand your food security.  If you are feeling you need any assistance in this area, we have resources available to support you today.          6/28/2024   SDOH- Food Insecurity   Within the past 12 months, did you worry that your food would run out before you got money to buy more? N    Within the past 12 months, did the food you bought just not last and you didn t have money to get more? N        Patient-reported         Health Care Directive:  Patient does not have a Health Care Directive: Discussed advance care planning with patient; however, patient declined at this time.    Carlota Espinoza      "

## 2024-11-29 ENCOUNTER — HOSPITAL ENCOUNTER (OUTPATIENT)
Dept: ULTRASOUND IMAGING | Facility: OTHER | Age: 69
Discharge: HOME | End: 2024-11-29
Attending: FAMILY MEDICINE | Admitting: FAMILY MEDICINE
Payer: MEDICARE

## 2024-11-29 DIAGNOSIS — N18.4 CHRONIC KIDNEY DISEASE, STAGE 4 (SEVERE) (H): ICD-10-CM

## 2024-11-29 PROCEDURE — 76770 US EXAM ABDO BACK WALL COMP: CPT

## 2024-12-11 ENCOUNTER — MYC MEDICAL ADVICE (OUTPATIENT)
Dept: FAMILY MEDICINE | Facility: OTHER | Age: 69
End: 2024-12-11
Payer: MEDICARE

## 2024-12-11 DIAGNOSIS — E11.22 TYPE 2 DIABETES MELLITUS WITH STAGE 3B CHRONIC KIDNEY DISEASE, WITHOUT LONG-TERM CURRENT USE OF INSULIN (H): Primary | ICD-10-CM

## 2024-12-11 DIAGNOSIS — N18.32 TYPE 2 DIABETES MELLITUS WITH STAGE 3B CHRONIC KIDNEY DISEASE, WITHOUT LONG-TERM CURRENT USE OF INSULIN (H): Primary | ICD-10-CM

## 2024-12-11 NOTE — TELEPHONE ENCOUNTER
As of January 1st, Victoza will not be covered. May submit a formulary exception or switch to Ozempic/Mounjaro.     Pt is currently prescribed Victoza 1.2 mg dose.     Raul'd up order for Ozempic 0.5mg dose    Routing to provider to review and respond.  Durga Van RN on 12/11/2024 at 12:56 PM

## 2025-02-07 ENCOUNTER — MYC REFILL (OUTPATIENT)
Dept: FAMILY MEDICINE | Facility: OTHER | Age: 70
End: 2025-02-07
Payer: MEDICARE

## 2025-02-07 DIAGNOSIS — E11.9 TYPE 2 DIABETES MELLITUS WITHOUT COMPLICATION, WITHOUT LONG-TERM CURRENT USE OF INSULIN (H): ICD-10-CM

## 2025-02-11 RX ORDER — LISINOPRIL 10 MG/1
10 TABLET ORAL DAILY
Qty: 90 TABLET | Refills: 4 | Status: SHIPPED | OUTPATIENT
Start: 2025-02-11

## 2025-02-11 NOTE — TELEPHONE ENCOUNTER
[facility] sent Rx request for the following:      Requested Prescriptions   Pending Prescriptions Disp Refills    lisinopril (ZESTRIL) 10 MG tablet 90 tablet 4     Sig: Take 1 tablet (10 mg) by mouth daily.       ACE Inhibitors (Including Combos) Protocol Failed - 2/11/2025  1:15 PM        Failed - Most recent GFR on file in the past 12 months >30        Failed - Normal serum potassium on file in past 12 months     Recent Labs   Lab Test 11/01/24  0919   POTASSIUM 5.4*            Last Prescription Date:   4/1/24  Last Fill Qty/Refills:         90, R-4    Last Office Visit:              4/1/24   Future Office visit:             Next 5 appointments (look out 90 days)      Mar 19, 2025 8:30 AM  (Arrive by 8:15 AM)  Provider Visit with Ton Chaudhry MD  [facility] [address]  [place] [address]  [phone]           Routing refill request to provider for review/approval because:  Labs out of range:  GFR, potassium    Salma Herron RN on 2/11/2025 at 1:26 PM

## 2025-02-12 ENCOUNTER — MYC REFILL (OUTPATIENT)
Dept: FAMILY MEDICINE | Facility: OTHER | Age: 70
End: 2025-02-12
Payer: MEDICARE

## 2025-02-12 DIAGNOSIS — E11.22 TYPE 2 DIABETES MELLITUS WITH STAGE 3B CHRONIC KIDNEY DISEASE, WITHOUT LONG-TERM CURRENT USE OF INSULIN (H): ICD-10-CM

## 2025-02-12 DIAGNOSIS — N18.32 TYPE 2 DIABETES MELLITUS WITH STAGE 3B CHRONIC KIDNEY DISEASE, WITHOUT LONG-TERM CURRENT USE OF INSULIN (H): ICD-10-CM

## 2025-02-18 NOTE — TELEPHONE ENCOUNTER
semaglutide (OZEMPIC) 2 MG/3ML pen 3 mL 4 12/12/2024 -- No   Sig - Route: Inject 0.5 mg subcutaneously every 7 days. - Subcutaneous   Sent to pharmacy as: Semaglutide(0.25 or 0.5MG/DOS) 2 MG/3ML Solution Pen-injector (OZEMPIC)   Class: E-Prescribe   Notes to Pharmacy: Previously on Victoza, no longer covered by insurance   Order: [id number]   E-Prescribing Status: Receipt confirmed by pharmacy (12/12/2024  8:17 AM CST)     [facility] - [place], [place] - [address]     Pt informed of prescription via [facility]. Haylie Priest RN .............. 2/18/2025  3:03 PM

## 2025-03-19 ENCOUNTER — OFFICE VISIT (OUTPATIENT)
Dept: FAMILY MEDICINE | Facility: OTHER | Age: 70
End: 2025-03-19
Attending: FAMILY MEDICINE
Payer: MEDICARE

## 2025-03-19 VITALS
BODY MASS INDEX: 39.53 KG/M2 | RESPIRATION RATE: 16 BRPM | OXYGEN SATURATION: 98 % | HEART RATE: 60 BPM | DIASTOLIC BLOOD PRESSURE: 82 MMHG | SYSTOLIC BLOOD PRESSURE: 124 MMHG | WEIGHT: 252.4 LBS | TEMPERATURE: 97.4 F

## 2025-03-19 DIAGNOSIS — E11.22 TYPE 2 DIABETES MELLITUS WITH STAGE 4 CHRONIC KIDNEY DISEASE, WITHOUT LONG-TERM CURRENT USE OF INSULIN (H): Primary | ICD-10-CM

## 2025-03-19 DIAGNOSIS — N18.4 TYPE 2 DIABETES MELLITUS WITH STAGE 4 CHRONIC KIDNEY DISEASE, WITHOUT LONG-TERM CURRENT USE OF INSULIN (H): Primary | ICD-10-CM

## 2025-03-19 DIAGNOSIS — I50.9 ACUTE ON CHRONIC CONGESTIVE HEART FAILURE, UNSPECIFIED HEART FAILURE TYPE (H): ICD-10-CM

## 2025-03-19 DIAGNOSIS — I48.0 PAROXYSMAL ATRIAL FIBRILLATION (H): ICD-10-CM

## 2025-03-19 DIAGNOSIS — N18.4 CKD (CHRONIC KIDNEY DISEASE) STAGE 4, GFR 15-29 ML/MIN (H): ICD-10-CM

## 2025-03-19 DIAGNOSIS — I10 ESSENTIAL HYPERTENSION: ICD-10-CM

## 2025-03-19 LAB
ALBUMIN SERPL BCG-MCNC: 4.2 G/DL (ref 3.5–5.2)
ALBUMIN UR-MCNC: NEGATIVE MG/DL
ALP SERPL-CCNC: 108 U/L (ref 40–150)
ALT SERPL W P-5'-P-CCNC: 15 U/L (ref 0–50)
ANION GAP SERPL CALCULATED.3IONS-SCNC: 11 MMOL/L (ref 7–15)
APPEARANCE UR: CLEAR
AST SERPL W P-5'-P-CCNC: 19 U/L (ref 0–45)
BILIRUB SERPL-MCNC: 0.3 MG/DL
BILIRUB UR QL STRIP: NEGATIVE
BUN SERPL-MCNC: 27.5 MG/DL (ref 8–23)
CALCIUM SERPL-MCNC: 9.1 MG/DL (ref 8.8–10.4)
CHLORIDE SERPL-SCNC: 106 MMOL/L (ref 98–107)
COLOR UR AUTO: ABNORMAL
CREAT SERPL-MCNC: 2.39 MG/DL (ref 0.51–0.95)
CREAT UR-MCNC: 166.3 MG/DL
EGFRCR SERPLBLD CKD-EPI 2021: 21 ML/MIN/1.73M2
ERYTHROCYTE [DISTWIDTH] IN BLOOD BY AUTOMATED COUNT: 12.8 % (ref 10–15)
EST. AVERAGE GLUCOSE BLD GHB EST-MCNC: 209 MG/DL
GLUCOSE SERPL-MCNC: 185 MG/DL (ref 70–99)
GLUCOSE UR STRIP-MCNC: >1000 MG/DL
HBA1C MFR BLD: 8.9 %
HCO3 SERPL-SCNC: 21 MMOL/L (ref 22–29)
HCT VFR BLD AUTO: 39 % (ref 35–47)
HGB BLD-MCNC: 12.8 G/DL (ref 11.7–15.7)
HGB UR QL STRIP: NEGATIVE
KETONES UR STRIP-MCNC: NEGATIVE MG/DL
LEUKOCYTE ESTERASE UR QL STRIP: NEGATIVE
MCH RBC QN AUTO: 29.8 PG (ref 26.5–33)
MCHC RBC AUTO-ENTMCNC: 32.8 G/DL (ref 31.5–36.5)
MCV RBC AUTO: 91 FL (ref 78–100)
MICROALBUMIN UR-MCNC: <12 MG/L
MICROALBUMIN/CREAT UR: NORMAL MG/G{CREAT}
NITRATE UR QL: NEGATIVE
PH UR STRIP: 5 [PH] (ref 5–9)
PLATELET # BLD AUTO: 141 10E3/UL (ref 150–450)
POTASSIUM SERPL-SCNC: 4.9 MMOL/L (ref 3.4–5.3)
PROT SERPL-MCNC: 7 G/DL (ref 6.4–8.3)
RBC # BLD AUTO: 4.3 10E6/UL (ref 3.8–5.2)
SODIUM SERPL-SCNC: 138 MMOL/L (ref 135–145)
SP GR UR STRIP: 1.02 (ref 1–1.03)
UROBILINOGEN UR STRIP-MCNC: NORMAL MG/DL
WBC # BLD AUTO: 5.1 10E3/UL (ref 4–11)

## 2025-03-19 PROCEDURE — 83036 HEMOGLOBIN GLYCOSYLATED A1C: CPT | Mod: ZL | Performed by: FAMILY MEDICINE

## 2025-03-19 PROCEDURE — 36415 COLL VENOUS BLD VENIPUNCTURE: CPT | Mod: ZL | Performed by: FAMILY MEDICINE

## 2025-03-19 PROCEDURE — 80051 ELECTROLYTE PANEL: CPT | Mod: ZL | Performed by: FAMILY MEDICINE

## 2025-03-19 PROCEDURE — 82247 BILIRUBIN TOTAL: CPT | Mod: ZL | Performed by: FAMILY MEDICINE

## 2025-03-19 PROCEDURE — 81003 URINALYSIS AUTO W/O SCOPE: CPT | Mod: ZL | Performed by: FAMILY MEDICINE

## 2025-03-19 PROCEDURE — 82570 ASSAY OF URINE CREATININE: CPT | Mod: ZL | Performed by: FAMILY MEDICINE

## 2025-03-19 PROCEDURE — 85014 HEMATOCRIT: CPT | Mod: ZL | Performed by: FAMILY MEDICINE

## 2025-03-19 PROCEDURE — 82040 ASSAY OF SERUM ALBUMIN: CPT | Mod: ZL | Performed by: FAMILY MEDICINE

## 2025-03-19 PROCEDURE — G0463 HOSPITAL OUTPT CLINIC VISIT: HCPCS

## 2025-03-19 PROCEDURE — 82043 UR ALBUMIN QUANTITATIVE: CPT | Mod: ZL | Performed by: FAMILY MEDICINE

## 2025-03-19 RX ORDER — SPIRONOLACTONE 25 MG/1
25 TABLET ORAL DAILY
Qty: 90 TABLET | Refills: 4 | Status: SHIPPED | OUTPATIENT
Start: 2025-03-19

## 2025-03-19 RX ORDER — BISOPROLOL FUMARATE 5 MG/1
TABLET, FILM COATED ORAL
Qty: 180 TABLET | Refills: 4 | Status: SHIPPED | OUTPATIENT
Start: 2025-03-19

## 2025-03-19 RX ORDER — SIMVASTATIN 10 MG
10 TABLET ORAL DAILY
Qty: 90 TABLET | Refills: 4 | Status: SHIPPED | OUTPATIENT
Start: 2025-03-19

## 2025-03-19 RX ORDER — GLYBURIDE 5 MG/1
5 TABLET ORAL
Qty: 90 TABLET | Refills: 4 | Status: SHIPPED | OUTPATIENT
Start: 2025-03-19

## 2025-03-19 RX ORDER — BLOOD-GLUCOSE METER
1 KIT MISCELLANEOUS 2 TIMES DAILY
Qty: 200 EACH | Refills: 6 | Status: SHIPPED | OUTPATIENT
Start: 2025-03-19

## 2025-03-19 ASSESSMENT — PAIN SCALES - GENERAL: PAINLEVEL_OUTOF10: NO PAIN (0)

## 2025-03-19 NOTE — NURSING NOTE
"Chief Complaint   Patient presents with    Diabetes       Initial /82   Pulse 60   Temp 97.4  F (36.3  C) (Temporal)   Resp 16   Wt 114.5 kg (252 lb 6.4 oz)   SpO2 98%   BMI 39.53 kg/m   Estimated body mass index is 39.53 kg/m  as calculated from the following:    Height as of 11/1/24: 1.702 m (5' 7\").    Weight as of this encounter: 114.5 kg (252 lb 6.4 oz).  Medication Reconciliation: complete          "

## 2025-03-19 NOTE — PROGRESS NOTES
Assessment & Plan     (E11.22,  N18.4) Type 2 diabetes mellitus with stage 4 chronic kidney disease, without long-term current use of insulin (H)  (primary encounter diagnosis)  Comment: with CKD progressing, will decrease the jardiance to 10 milligram form 25. Will increase the ozempic to offset the decreased jardiance dose as well as to improve on her A1c which is still over target of 8 or less.  Just started this so a little too early now to make a change, but can start the next higher dose, 1 milligram, in 2-4 weeks.   Plan: B-D ULTRA-FINE 33 LANCETS MISC, blood glucose         (NO BRAND SPECIFIED) test strip, bisoprolol         (ZEBETA) 5 MG tablet, glyBURIDE (DIABETA         /MICRONASE) 5 MG tablet, simvastatin (ZOCOR) 10        MG tablet, Hemoglobin A1c, Albumin Random Urine        Quantitative with Creat Ratio, empagliflozin         (JARDIANCE) 10 MG TABS tablet, Albumin Random         Urine Quantitative with Creat Ratio             (I10) Essential hypertension  Comment: is at goal  Plan: spironolactone (ALDACTONE) 25 MG tablet        refilled    (N18.4) CKD (chronic kidney disease) stage 4, GFR 15-29 ml/min (H)  Comment: no renal US, so this is presumably form her diabetes. Will continue to work on getting her in with nephrology.   Plan: UA reflex to Microscopic, Comprehensive         Metabolic Panel, CBC W PLT No Diff             (I50.9) Acute on chronic congestive heart failure, unspecified heart failure type (H)  Comment: exam is normal today. Is followed by cardiology.   Plan:      (I48.0) Paroxysmal atrial fibrillation (H)  Comment: also followed by cardiology  Plan:      (Z68.41) Body mass index (BMI) 40.0-44.9, adult (H)  Comment: is no on Ozempic, and we takled about diet and exercise to improve her hypertension and diabetes control  Plan:        The longitudinal plan of care for the diagnosis(es)/condition(s) as documented were addressed during this visit. Due to the added complexity in care, I  "will continue to support Glenys in the subsequent management and with ongoing continuity of care.      BMI  Estimated body mass index is 39.53 kg/m  as calculated from the following:    Height as of 11/1/24: 1.702 m (5' 7\").    Weight as of this encounter: 114.5 kg (252 lb 6.4 oz).             Return in about 3 months (around 6/19/2025) for Routine preventive.    Josh Veronica is a 69 year old, presenting for the following health issues:  Diabetes      3/19/2025     8:10 AM   Additional Questions   Roomed by ARON Rai   Accompanied by Self         3/19/2025     8:10 AM   Patient Reported Additional Medications   Patient reports taking the following new medications N/A     History of Present Illness       She eats 2-3 servings of fruits and vegetables daily.She consumes 0 sweetened beverage(s) daily.She exercises with enough effort to increase her heart rate 30 to 60 minutes per day.  She exercises with enough effort to increase her heart rate 6 days per week.   She is taking medications regularly.          Diabetes Follow-up    How often are you checking your blood sugar? One time daily  What time of day are you checking your blood sugars (select all that apply)?  Before and after meals  Have you had any blood sugars above 200?  Yes 250  Have you had any blood sugars below 70?  No  What symptoms do you notice when your blood sugar is low?  Shaky  What concerns do you have today about your diabetes? None   Do you have any of these symptoms? (Select all that apply)  No numbness or tingling in feet.  No redness, sores or blisters on feet.  No complaints of excessive thirst.  No reports of blurry vision.  No significant changes to weight.  Have you had a diabetic eye exam in the last 12 months? No        BP Readings from Last 2 Encounters:   03/19/25 124/82   11/01/24 130/73     Hemoglobin A1C (%)   Date Value   11/01/2024 8.3 (H)   07/01/2024 9.1 (H)   08/17/2020 8.4 (H)   05/10/2019 7.6 (H)     LDL " Cholesterol Calculated (mg/dL)   Date Value   04/01/2024 87   02/08/2023 78   08/17/2020 74   05/10/2019 75     She has a sense her sugars are coming down. Is doing more physical activity. Tolerating meds well.  Her gfr dropped last fall, to stage 4, nephrology consult placed but she has not been able to get in yet. Was on victoza, but insurance changed and 4 weeks ago started ozempic.     Has not had any runs of a fib since last summer. Has a pacer now, and sees cardiology. They contacted her when they interrogated the pacer.       How many servings of fruits and vegetables do you eat daily?  2-3  On average, how many sweetened beverages do you drink each day (Examples: soda, juice, sweet tea, etc.  Do NOT count diet or artificially sweetened beverages)?   0  How many days per week do you exercise enough to make your heart beat faster? 6  How many minutes a day do you exercise enough to make your heart beat faster? 30 - 60  How many days per week do you miss taking your medication? 0            Objective    /82   Pulse 60   Temp 97.4  F (36.3  C) (Temporal)   Resp 16   Wt 114.5 kg (252 lb 6.4 oz)   SpO2 98%   BMI 39.53 kg/m    Body mass index is 39.53 kg/m .  Physical Exam   GENERAL: alert and no distress  NECK: no adenopathy, no asymmetry, masses, or scars  RESP: lungs clear to auscultation - no rales, rhonchi or wheezes  CV: regular rate and rhythm, normal S1 S2, no S3 or S4, no murmur, click or rub, no peripheral edema   ABDOMEN: soft, nontender, no hepatosplenomegaly, no masses and bowel sounds normal  MS: no gross musculoskeletal defects noted, no edema  Diabetic foot exam: normal DP and PT pulses, no trophic changes or ulcerative lesions, normal sensory exam, and normal monofilament exam    Results for orders placed or performed in visit on 03/19/25   Hemoglobin A1c     Status: Abnormal   Result Value Ref Range    Estimated Average Glucose 209 (H) <117 mg/dL    Hemoglobin A1C 8.9 (H) <5.7 %    Albumin Random Urine Quantitative with Creat Ratio     Status: None   Result Value Ref Range    Creatinine Urine mg/dL 166.3 mg/dL    Albumin Urine mg/L <12.0 mg/L    Albumin Urine mg/g Cr     UA reflex to Microscopic     Status: Abnormal   Result Value Ref Range    Color Urine Light Yellow Colorless, Straw, Light Yellow, Yellow    Appearance Urine Clear Clear    Glucose Urine >1000 (A) Negative mg/dL    Bilirubin Urine Negative Negative    Ketones Urine Negative Negative mg/dL    Specific Gravity Urine 1.018 1.000 - 1.030    Blood Urine Negative Negative    pH Urine 5.0 5.0 - 9.0    Protein Albumin Urine Negative Negative mg/dL    Urobilinogen Urine Normal Normal, 2.0 mg/dL    Nitrite Urine Negative Negative    Leukocyte Esterase Urine Negative Negative    Narrative    Microscopic not indicated   Comprehensive Metabolic Panel     Status: Abnormal   Result Value Ref Range    Sodium 138 135 - 145 mmol/L    Potassium 4.9 3.4 - 5.3 mmol/L    Carbon Dioxide (CO2) 21 (L) 22 - 29 mmol/L    Anion Gap 11 7 - 15 mmol/L    Urea Nitrogen 27.5 (H) 8.0 - 23.0 mg/dL    Creatinine 2.39 (H) 0.51 - 0.95 mg/dL    GFR Estimate 21 (L) >60 mL/min/1.73m2    Calcium 9.1 8.8 - 10.4 mg/dL    Chloride 106 98 - 107 mmol/L    Glucose 185 (H) 70 - 99 mg/dL    Alkaline Phosphatase 108 40 - 150 U/L    AST 19 0 - 45 U/L    ALT 15 0 - 50 U/L    Protein Total 7.0 6.4 - 8.3 g/dL    Albumin 4.2 3.5 - 5.2 g/dL    Bilirubin Total 0.3 <=1.2 mg/dL   CBC W PLT No Diff     Status: Abnormal   Result Value Ref Range    WBC Count 5.1 4.0 - 11.0 10e3/uL    RBC Count 4.30 3.80 - 5.20 10e6/uL    Hemoglobin 12.8 11.7 - 15.7 g/dL    Hematocrit 39.0 35.0 - 47.0 %    MCV 91 78 - 100 fL    MCH 29.8 26.5 - 33.0 pg    MCHC 32.8 31.5 - 36.5 g/dL    RDW 12.8 10.0 - 15.0 %    Platelet Count 141 (L) 150 - 450 10e3/uL             Signed Electronically by: Ton Chaudhry MD

## 2025-04-26 ENCOUNTER — HEALTH MAINTENANCE LETTER (OUTPATIENT)
Age: 70
End: 2025-04-26

## 2025-06-13 PROBLEM — Z95.0 PACEMAKER: Status: ACTIVE | Noted: 2023-12-15

## 2025-06-25 SDOH — HEALTH STABILITY: PHYSICAL HEALTH: ON AVERAGE, HOW MANY MINUTES DO YOU ENGAGE IN EXERCISE AT THIS LEVEL?: 30 MIN

## 2025-06-25 SDOH — HEALTH STABILITY: PHYSICAL HEALTH: ON AVERAGE, HOW MANY DAYS PER WEEK DO YOU ENGAGE IN MODERATE TO STRENUOUS EXERCISE (LIKE A BRISK WALK)?: 5 DAYS

## 2025-06-25 ASSESSMENT — SOCIAL DETERMINANTS OF HEALTH (SDOH): HOW OFTEN DO YOU GET TOGETHER WITH FRIENDS OR RELATIVES?: THREE TIMES A WEEK

## 2025-06-30 ENCOUNTER — OFFICE VISIT (OUTPATIENT)
Dept: FAMILY MEDICINE | Facility: OTHER | Age: 70
End: 2025-06-30
Attending: FAMILY MEDICINE
Payer: MEDICARE

## 2025-06-30 VITALS
SYSTOLIC BLOOD PRESSURE: 122 MMHG | BODY MASS INDEX: 39.57 KG/M2 | DIASTOLIC BLOOD PRESSURE: 80 MMHG | WEIGHT: 246.2 LBS | HEIGHT: 66 IN | TEMPERATURE: 97.1 F | OXYGEN SATURATION: 100 % | RESPIRATION RATE: 14 BRPM | HEART RATE: 91 BPM

## 2025-06-30 DIAGNOSIS — N18.4 CHRONIC KIDNEY DISEASE, STAGE 4 (SEVERE) (H): ICD-10-CM

## 2025-06-30 DIAGNOSIS — Z71.85 VACCINE COUNSELING: ICD-10-CM

## 2025-06-30 DIAGNOSIS — N18.4 TYPE 2 DIABETES MELLITUS WITH STAGE 4 CHRONIC KIDNEY DISEASE, WITHOUT LONG-TERM CURRENT USE OF INSULIN (H): ICD-10-CM

## 2025-06-30 DIAGNOSIS — Z00.00 ENCOUNTER FOR MEDICARE ANNUAL WELLNESS EXAM: Primary | ICD-10-CM

## 2025-06-30 DIAGNOSIS — Z95.0 PACEMAKER: ICD-10-CM

## 2025-06-30 DIAGNOSIS — E11.22 TYPE 2 DIABETES MELLITUS WITH STAGE 4 CHRONIC KIDNEY DISEASE, WITHOUT LONG-TERM CURRENT USE OF INSULIN (H): ICD-10-CM

## 2025-06-30 LAB
CHOLEST SERPL-MCNC: 142 MG/DL
CREAT UR-MCNC: 102 MG/DL
ERYTHROCYTE [DISTWIDTH] IN BLOOD BY AUTOMATED COUNT: 12.9 % (ref 10–15)
EST. AVERAGE GLUCOSE BLD GHB EST-MCNC: 154 MG/DL
FASTING STATUS PATIENT QL REPORTED: YES
HBA1C MFR BLD: 7 %
HCT VFR BLD AUTO: 40.9 % (ref 35–47)
HDLC SERPL-MCNC: 41 MG/DL
HGB BLD-MCNC: 13.1 G/DL (ref 11.7–15.7)
LDLC SERPL CALC-MCNC: 80 MG/DL
MCH RBC QN AUTO: 29.8 PG (ref 26.5–33)
MCHC RBC AUTO-ENTMCNC: 32 G/DL (ref 31.5–36.5)
MCV RBC AUTO: 93 FL (ref 78–100)
MICROALBUMIN UR-MCNC: <12 MG/L
MICROALBUMIN/CREAT UR: NORMAL MG/G{CREAT}
NONHDLC SERPL-MCNC: 101 MG/DL
PLATELET # BLD AUTO: 142 10E3/UL (ref 150–450)
RBC # BLD AUTO: 4.39 10E6/UL (ref 3.8–5.2)
TRIGL SERPL-MCNC: 104 MG/DL
WBC # BLD AUTO: 6.1 10E3/UL (ref 4–11)

## 2025-06-30 PROCEDURE — 3051F HG A1C>EQUAL 7.0%<8.0%: CPT | Performed by: FAMILY MEDICINE

## 2025-06-30 PROCEDURE — 36415 COLL VENOUS BLD VENIPUNCTURE: CPT | Mod: ZL | Performed by: FAMILY MEDICINE

## 2025-06-30 PROCEDURE — 3074F SYST BP LT 130 MM HG: CPT | Performed by: FAMILY MEDICINE

## 2025-06-30 PROCEDURE — 90480 ADMN SARSCOV2 VAC 1/ONLY CMP: CPT

## 2025-06-30 PROCEDURE — 3048F LDL-C <100 MG/DL: CPT | Performed by: FAMILY MEDICINE

## 2025-06-30 PROCEDURE — G2211 COMPLEX E/M VISIT ADD ON: HCPCS | Performed by: FAMILY MEDICINE

## 2025-06-30 PROCEDURE — 1126F AMNT PAIN NOTED NONE PRSNT: CPT | Performed by: FAMILY MEDICINE

## 2025-06-30 PROCEDURE — 82465 ASSAY BLD/SERUM CHOLESTEROL: CPT | Mod: ZL | Performed by: FAMILY MEDICINE

## 2025-06-30 PROCEDURE — 83036 HEMOGLOBIN GLYCOSYLATED A1C: CPT | Mod: ZL | Performed by: FAMILY MEDICINE

## 2025-06-30 PROCEDURE — G0463 HOSPITAL OUTPT CLINIC VISIT: HCPCS | Mod: 25 | Performed by: FAMILY MEDICINE

## 2025-06-30 PROCEDURE — 85027 COMPLETE CBC AUTOMATED: CPT | Mod: ZL | Performed by: FAMILY MEDICINE

## 2025-06-30 PROCEDURE — 82570 ASSAY OF URINE CREATININE: CPT | Mod: ZL | Performed by: FAMILY MEDICINE

## 2025-06-30 PROCEDURE — 99214 OFFICE O/P EST MOD 30 MIN: CPT | Mod: 25 | Performed by: FAMILY MEDICINE

## 2025-06-30 PROCEDURE — 3079F DIAST BP 80-89 MM HG: CPT | Performed by: FAMILY MEDICINE

## 2025-06-30 PROCEDURE — G0439 PPPS, SUBSEQ VISIT: HCPCS | Performed by: FAMILY MEDICINE

## 2025-06-30 ASSESSMENT — PAIN SCALES - GENERAL: PAINLEVEL_OUTOF10: NO PAIN (0)

## 2025-06-30 NOTE — PROGRESS NOTES
"Preventive Care Visit  [facility]  Ton Chaudhry MD, Family Medicine  Jun 30, 2025      Encounter for Medicare annual wellness exam    -Mammo done 6/18/24 (impression: negative). Follow up annually. Scheduled for 7/10/25.    -DEXA done 10/15/21 (impression: normal bone density). Follow-up 8-10 years.     -Colon cancer screening done via colonoscopy on 9/1/16 (impression: WNL). Follow up 10 years.     -Immunizations: Patient is due for the following: Covid and RSV.    -Derm: Does patient regularly see dermatologist? No.     -Refills pended for requested medications.  -Labs pended.     BMI  Estimated body mass index is 40.04 kg/m  as calculated from the following:    Height as of this encounter: 1.67 m (5' 5.75\").    Weight as of this encounter: 111.7 kg (246 lb 3.2 oz).   Weight management plan: Discussed healthy diet and exercise guidelines    Counseling  Appropriate preventive services were addressed with this patient via screening, questionnaire, or discussion as appropriate for fall prevention, nutrition, physical activity, Tobacco-use cessation, social engagement, weight loss and cognition.  Checklist reviewing preventive services available has been given to the patient.  Reviewed patient's diet, addressing concerns and/or questions.   The patient was instructed to see the dentist every 6 months.     Work on weight loss  Regular exercise    No follow-ups on file.    Josh Veronica is a 69 year old, presenting for the following:  Medicare Visit        6/30/2025     9:14 AM   Additional Questions   Roomed by BRITTNY Colin         Health Care Directive  Patient does not have a Health Care Directive: Discussed advance care planning with patient; however, patient declined at this time.        6/25/2025   General Health   How would you rate your overall physical health? Good   Feel stress (tense, anxious, or unable to sleep) Not at all         6/25/2025   Nutrition   Diet: Regular (no " restrictions)         6/25/2025   Exercise   Days per week of moderate/strenous exercise 5 days   Average minutes spent exercising at this level 30 min         6/25/2025   Social Factors   Frequency of gathering with friends or relatives Three times a week   Worry food won't last until get money to buy more No   Food not last or not have enough money for food? No   Do you have housing? (Housing is defined as stable permanent housing and does not include staying outside in a car, in a tent, in an abandoned building, in an overnight shelter, or couch-surfing.) Yes   Are you worried about losing your housing? No   Lack of transportation? No   Unable to get utilities (heat,electricity)? No         6/25/2025   Fall Risk   Fallen 2 or more times in the past year? No   Trouble with walking or balance? No          6/25/2025   Activities of Daily Living- Home Safety   Needs help with the following daily activites None of the above   Safety concerns in the home None of the above         6/25/2025   Dental   Dentist two times every year? (!) NO         6/25/2025   Hearing Screening   Hearing concerns? None of the above         6/25/2025   Driving Risk Screening   Patient/family members have concerns about driving No         6/25/2025   General Alertness/Fatigue Screening   Have you been more tired than usual lately? No         6/25/2025   Urinary Incontinence Screening   Bothered by leaking urine in past 6 months No     Today's PHQ-2 Score:       6/29/2025     5:45 PM   PHQ-2 ( 1999 [employer])   Q1: Little interest or pleasure in doing things 0   Q2: Feeling down, depressed or hopeless 0   PHQ-2 Score 0    Q1: Little interest or pleasure in doing things Not at all   Q2: Feeling down, depressed or hopeless Not at all   PHQ-2 Score 0       Patient-reported         6/25/2025   Substance Use   Alcohol more than 3/day or more than 7/wk No   Do you have a current opioid prescription? No   How severe/bad is pain from 1 to 10? 0/10 (No  Pain)   Do you use any other substances recreationally? No     Social History     Tobacco Use    Smoking status: Former     Current packs/day: 0.50     Average packs/day: 0.5 packs/day for 20.0 years (10.0 ttl pk-yrs)     Types: Cigarettes    Smokeless tobacco: Never   Vaping Use    Vaping status: Never Used   Substance Use Topics    Alcohol use: No     Alcohol/week: 0.0 standard drinks of alcohol    Drug use: No              Reviewed and updated as needed this visit by Provider                             Current providers sharing in care for this patient include:  Patient Care Team:  Ton Chaudhry MD as PCP - General (Family Practice)  Ton Chaudhry MD as Assigned PCP    The following health maintenance items are reviewed in Epic and correct as of today:  Health Maintenance   Topic Date Due    HF ACTION PLAN  Never done    EYE EXAM  Never done    RSV VACCINE (1 - Risk 60-74 years 1-dose series) Never done    LIPID  04/01/2025    COVID-19 VACCINE (7 - 2024-25 season) 05/01/2025    MICROALBUMIN  06/19/2025    HEMOGLOBIN  09/19/2025    A1C  09/19/2025    BMP  09/19/2025    ALT  03/19/2026    DIABETIC FOOT EXAM  03/19/2026    CBC  03/19/2026    MAMMO SCREENING  06/18/2026    MEDICARE ANNUAL WELLNESS VISIT  06/30/2026    FALL RISK ASSESSMENT  06/30/2026    COLORECTAL CANCER SCREENING  09/01/2026    ADVANCE CARE PLANNING  11/01/2029    DTAP/TDAP/TD VACCINE (3 - Td or Tdap) 08/17/2030    DEXA  10/15/2036    PARATHYROID  Completed    PHOSPHORUS  Completed    TSH W/FREE T4 REFLEX  Completed    HEPATITIS C SCREENING  Completed    PHQ-2 (once per calendar year)  Completed    INFLUENZA VACCINE  Completed    PNEUMOCOCCAL VACCINE 50+ YEARS  Completed    URINALYSIS  Completed    ALK PHOS  Completed    ZOSTER VACCINE  Completed    HPV VACCINE  Aged Out    MENINGITIS VACCINE  Aged Out    PAP  Discontinued    LUNG CANCER SCREENING  Discontinued        Objective      Exam  /80   Pulse 91   Temp 97.1  F (36.2  C)   Resp 14  "  Ht 1.67 m (5' 5.75\")   Wt 111.7 kg (246 lb 3.2 oz)   SpO2 100%   BMI 40.04 kg/m           6/30/2025   Mini Cog   Clock Draw Score 2 Normal   3 Item Recall 3 objects recalled   Mini Cog Total Score 5          Signed Electronically by: Ton Chaudhry MD    "

## 2025-06-30 NOTE — PATIENT INSTRUCTIONS
Patient Education   Preventive Care Advice   This is general advice given by our system to help you stay healthy. However, your care team may have specific advice just for you. Please talk to your care team about your preventive care needs.  Nutrition  Eat 5 or more servings of fruits and vegetables each day.  Try wheat bread, brown rice and whole grain pasta (instead of white bread, rice, and pasta).  Get enough calcium and vitamin D. Check the label on foods and aim for 100% of the RDA (recommended daily allowance).  Lifestyle  Exercise at least 150 minutes each week  (30 minutes a day, 5 days a week).  Do muscle strengthening activities 2 days a week. These help control your weight and prevent disease.  No smoking.  Wear sunscreen to prevent skin cancer.  Have a dental exam and cleaning every 6 months.  Yearly exams  See your health care team every year to talk about:  Any changes in your health.  Any medicines your care team has prescribed.  Preventive care, family planning, and ways to prevent chronic diseases.  Shots (vaccines)   HPV shots (up to age 26), if you've never had them before.  Hepatitis B shots (up to age 59), if you've never had them before.  COVID-19 shot: Get this shot when it's due.  Flu shot: Get a flu shot every year.  Tetanus shot: Get a tetanus shot every 10 years.  Pneumococcal, hepatitis A, and RSV shots: Ask your care team if you need these based on your risk.  Shingles shot (for age 50 and up)  General health tests  Diabetes screening:  Starting at age 35, Get screened for diabetes at least every 3 years.  If you are younger than age 35, ask your care team if you should be screened for diabetes.  Cholesterol test: At age 39, start having a cholesterol test every 5 years, or more often if advised.  Bone density scan (DEXA): At age 50, ask your care team if you should have this scan for osteoporosis (brittle bones).  Hepatitis C: Get tested at least once in your life.  STIs (sexually  transmitted infections)  Before age 24: Ask your care team if you should be screened for STIs.  After age 24: Get screened for STIs if you're at risk. You are at risk for STIs (including HIV) if:  You are sexually active with more than one person.  You don't use condoms every time.  You or a partner was diagnosed with a sexually transmitted infection.  If you are at risk for HIV, ask about PrEP medicine to prevent HIV.  Get tested for HIV at least once in your life, whether you are at risk for HIV or not.  Cancer screening tests  Cervical cancer screening: If you have a cervix, begin getting regular cervical cancer screening tests starting at age 21.  Breast cancer scan (mammogram): If you've ever had breasts, begin having regular mammograms starting at age 40. This is a scan to check for breast cancer.  Colon cancer screening: It is important to start screening for colon cancer at age 45.  Have a colonoscopy test every 10 years (or more often if you're at risk) Or, ask your provider about stool tests like a FIT test every year or Cologuard test every 3 years.  To learn more about your testing options, visit:   .  For help making a decision, visit:   https://bit.ly/si47242.  Prostate cancer screening test: If you have a prostate, ask your care team if a prostate cancer screening test (PSA) at age 55 is right for you.  Lung cancer screening: If you are a current or former smoker ages 50 to 80, ask your care team if ongoing lung cancer screenings are right for you.  For informational purposes only. Not to replace the advice of your health care provider. Copyright   2023 [employer]. All rights reserved. Clinically reviewed by the [facility] Transitions Program. [employer] [id number] - REV 01/24.

## 2025-06-30 NOTE — PROGRESS NOTES
Assessment & Plan     (Z00.00) Encounter for Medicare annual wellness exam  (primary encounter diagnosis)  Comment: see below  Plan:      (N18.4) Chronic kidney disease, stage 4 (severe) (H)  Comment: this has not been progressing, however she has not yet established with nephrology. Will try again with [place]. Discussed with her what dialysis or a transplant might look like and she would pursue whichever is needed at this point in her life.   Plan: Albumin Random Urine Quantitative with Creat         Ratio, Adult Nephrology  Referral             (Z71.85) Vaccine counseling  Comment: wants her COVID vaccine  Plan: COVID-19 12+ (PFIZER), RSV vaccine, bivalent,         ABRYSVO, injection             (E11.22,  N18.4) Type 2 diabetes mellitus with stage 4 chronic kidney disease, without long-term current use of insulin (H)  Comment: significant improvement with the GLP1. No dose change made today.   Plan: Lipid Profile, blood glucose (NO BRAND         SPECIFIED) test strip, Hemoglobin A1c, CBC W         PLT No Diff             (Z95.0) Pacemaker  Comment: followed by cardiology   Plan:        The longitudinal plan of care for the diagnosis(es)/condition(s) as documented were addressed during this visit. Due to the added complexity in care, I will continue to support Glenys in the subsequent management and with ongoing continuity of care.            Counseling  Appropriate preventive services were addressed with this patient via screening, questionnaire, or discussion as appropriate for fall prevention, nutrition, physical activity, Tobacco-use cessation, social engagement, weight loss and cognition.  Checklist reviewing preventive services available has been given to the patient.  Reviewed patient's diet, addressing concerns and/or questions.   The patient was instructed to see the dentist every 6 months.             Josh Veronica is a 69 year old, presenting for the following health issues:  Medicare  "Visit      6/30/2025     9:14 AM   Additional Questions   Roomed by BRITTNY Colin     HPI      Here for her wellness check and follow up on CKD and diabetes. She still has not been able to get in with nephrology through [facility]. This has been over a year. She wants to go to [place] instead, if possible. Uses no NSAIDS at all. Rare tylenol only.     Regarding her diabetes, she has not been checking, had a hard time getting her test strips. Has not felt lows at all, and overall feels \"great\". Is now swimming nearly daily in her pool. Tolerating ozempic well, with another 6#    Has no cardiac symptoms at all now. Sees cardiology once a year. No chest pain. Has no limits on her swimming- will clean her pool for an hour, and the swim with small children for at least another hour or more.     Recent Labs   Lab Test 03/19/25  0856 11/01/24  0919 07/01/24  0853 04/01/24  1004 02/08/23  0954 08/25/22  1546 10/14/21  0948 08/17/20  1130 08/17/20  1129 05/10/19  0904   A1C 8.9* 8.3* 9.1* 10.6* 12.2*  --  7.8*   < >  --  7.6*   LDL  --   --   --  87 78  --  95  --  74 75   HDL  --   --   --  38* 44*  --  47  --  42 47   TRIG  --   --   --  196* 189*  --  144  --  119 109   ALT 15  --   --  20 17  --  15   < > 16  --    CR 2.39* 2.72*  --  1.96* 2.04*   < > 1.88*  --  1.98* 1.82*   GFRESTIMATED 21* 18*  --  27* 26*   < > 28*  --  25* 28*   GFRESTBLACK  --   --   --   --   --   --   --   --  31* 34*   POTASSIUM 4.9 5.4*  --  4.8 4.7   < > 5.0  --  5.0 5.3*   TSH  --   --   --   --  2.63  --   --   --   --   --     < > = values in this interval not displayed.                            Objective    /80   Pulse 91   Temp 97.1  F (36.2  C)   Resp 14   Ht 1.67 m (5' 5.75\")   Wt 111.7 kg (246 lb 3.2 oz)   SpO2 100%   BMI 40.04 kg/m    Body mass index is 40.04 kg/m .  Physical Exam   GENERAL: alert and no distress  EYES: Eyes grossly normal to inspection, PERRL and conjunctivae and sclerae normal  HENT: ear canals and TM's " normal, nose and mouth without ulcers or lesions  NECK: no adenopathy, no asymmetry, masses, or scars  RESP: lungs clear to auscultation - no rales, rhonchi or wheezes  CV: regular rate and rhythm, normal S1 S2, no S3 or S4, no murmur, click or rub, no peripheral edema  ABDOMEN: soft, nontender, no hepatosplenomegaly, no masses and bowel sounds normal  MS: no gross musculoskeletal defects noted, no edema  SKIN: no suspicious lesions or rashes  NEURO: Normal strength and tone, mentation intact and speech normal  PSYCH: mentation appears normal, affect normal/bright    Results for orders placed or performed in visit on 06/30/25   Lipid Profile     Status: Abnormal   Result Value Ref Range    Cholesterol 142 <200 mg/dL    Triglycerides 104 <150 mg/dL    Direct Measure HDL 41 (L) >=50 mg/dL    LDL Cholesterol Calculated 80 <100 mg/dL    Non HDL Cholesterol 101 <130 mg/dL    Patient Fasting > 8hrs? Yes     Narrative    Cholesterol  Desirable: < 200 mg/dL  Borderline High: 200 - 239 mg/dL  High: >= 240 mg/dL    Triglycerides  Normal: < 150 mg/dL  Borderline High: 150 - 199 mg/dL  High: 200-499 mg/dL  Very High: >= 500 mg/dL    Direct Measure HDL  Female: >= 50 mg/dL   Male: >= 40 mg/dL    LDL Cholesterol  Desirable: < 100 mg/dL  Above Desirable: 100 - 129 mg/dL   Borderline High: 130 - 159 mg/dL   High:  160 - 189 mg/dL   Very High: >= 190 mg/dL    Non HDL Cholesterol  Desirable: < 130 mg/dL  Above Desirable: 130 - 159 mg/dL  Borderline High: 160 - 189 mg/dL  High: 190 - 219 mg/dL  Very High: >= 220 mg/dL   Albumin Random Urine Quantitative with Creat Ratio     Status: None   Result Value Ref Range    Creatinine Urine mg/dL 102.0 mg/dL    Albumin Urine mg/L <12.0 mg/L    Albumin Urine mg/g Cr     Hemoglobin A1c     Status: Abnormal   Result Value Ref Range    Estimated Average Glucose 154 (H) <117 mg/dL    Hemoglobin A1C 7.0 (H) <5.7 %   CBC W PLT No Diff     Status: Abnormal   Result Value Ref Range    WBC Count 6.1 4.0  - 11.0 10e3/uL    RBC Count 4.39 3.80 - 5.20 10e6/uL    Hemoglobin 13.1 11.7 - 15.7 g/dL    Hematocrit 40.9 35.0 - 47.0 %    MCV 93 78 - 100 fL    MCH 29.8 26.5 - 33.0 pg    MCHC 32.0 31.5 - 36.5 g/dL    RDW 12.9 10.0 - 15.0 %    Platelet Count 142 (L) 150 - 450 10e3/uL             Signed Electronically by: Ton Chaudhry MD

## 2025-07-01 ENCOUNTER — RESULTS FOLLOW-UP (OUTPATIENT)
Dept: FAMILY MEDICINE | Facility: OTHER | Age: 70
End: 2025-07-01

## 2025-08-27 ENCOUNTER — TELEPHONE (OUTPATIENT)
Dept: FAMILY MEDICINE | Facility: OTHER | Age: 70
End: 2025-08-27
Payer: MEDICARE

## 2025-08-27 DIAGNOSIS — Z95.2 S/P AVR (AORTIC VALVE REPLACEMENT): ICD-10-CM

## 2025-08-27 DIAGNOSIS — I48.0 PAROXYSMAL ATRIAL FIBRILLATION (H): ICD-10-CM

## 2025-08-27 DIAGNOSIS — Z51.81 ANTICOAGULATION GOAL OF INR 2 TO 3: Primary | ICD-10-CM

## 2025-08-27 DIAGNOSIS — Z98.890 S/P MITRAL VALVE REPAIR: ICD-10-CM

## 2025-08-27 DIAGNOSIS — Z79.01 ANTICOAGULATION GOAL OF INR 2 TO 3: Primary | ICD-10-CM

## (undated) RX ORDER — POLYMYXIN B SULFATE AND TRIMETHOPRIM 1; 10000 MG/ML; [USP'U]/ML
SOLUTION OPHTHALMIC
Status: DISPENSED
Start: 2018-11-11